# Patient Record
Sex: FEMALE | Race: WHITE | NOT HISPANIC OR LATINO | URBAN - METROPOLITAN AREA
[De-identification: names, ages, dates, MRNs, and addresses within clinical notes are randomized per-mention and may not be internally consistent; named-entity substitution may affect disease eponyms.]

---

## 2017-11-12 ENCOUNTER — EMERGENCY (EMERGENCY)
Facility: HOSPITAL | Age: 47
LOS: 0 days | Discharge: HOME | End: 2017-11-12

## 2017-11-12 DIAGNOSIS — M25.561 PAIN IN RIGHT KNEE: ICD-10-CM
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
## 2017-11-12 DIAGNOSIS — S89.91XA UNSPECIFIED INJURY OF RIGHT LOWER LEG, INITIAL ENCOUNTER: ICD-10-CM

## 2017-11-12 DIAGNOSIS — R07.9 CHEST PAIN, UNSPECIFIED: ICD-10-CM
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
## 2019-11-23 ENCOUNTER — EMERGENCY (EMERGENCY)
Facility: HOSPITAL | Age: 49
LOS: 0 days | Discharge: HOME | End: 2019-11-23
Attending: EMERGENCY MEDICINE | Admitting: EMERGENCY MEDICINE
Payer: COMMERCIAL

## 2019-11-23 VITALS — SYSTOLIC BLOOD PRESSURE: 145 MMHG | HEART RATE: 68 BPM | DIASTOLIC BLOOD PRESSURE: 89 MMHG

## 2019-11-23 VITALS
RESPIRATION RATE: 18 BRPM | OXYGEN SATURATION: 100 % | DIASTOLIC BLOOD PRESSURE: 81 MMHG | TEMPERATURE: 98 F | SYSTOLIC BLOOD PRESSURE: 152 MMHG | HEART RATE: 68 BPM

## 2019-11-23 DIAGNOSIS — M62.830 MUSCLE SPASM OF BACK: ICD-10-CM

## 2019-11-23 DIAGNOSIS — M54.2 CERVICALGIA: ICD-10-CM

## 2019-11-23 DIAGNOSIS — I10 ESSENTIAL (PRIMARY) HYPERTENSION: ICD-10-CM

## 2019-11-23 LAB
ALBUMIN SERPL ELPH-MCNC: 4.4 G/DL — SIGNIFICANT CHANGE UP (ref 3.5–5.2)
ALP SERPL-CCNC: 86 U/L — SIGNIFICANT CHANGE UP (ref 30–115)
ALT FLD-CCNC: 28 U/L — SIGNIFICANT CHANGE UP (ref 0–41)
ANION GAP SERPL CALC-SCNC: 10 MMOL/L — SIGNIFICANT CHANGE UP (ref 7–14)
AST SERPL-CCNC: 21 U/L — SIGNIFICANT CHANGE UP (ref 0–41)
BASOPHILS # BLD AUTO: 0.02 K/UL — SIGNIFICANT CHANGE UP (ref 0–0.2)
BASOPHILS NFR BLD AUTO: 0.4 % — SIGNIFICANT CHANGE UP (ref 0–1)
BILIRUB SERPL-MCNC: 0.9 MG/DL — SIGNIFICANT CHANGE UP (ref 0.2–1.2)
BUN SERPL-MCNC: 13 MG/DL — SIGNIFICANT CHANGE UP (ref 10–20)
CALCIUM SERPL-MCNC: 9.5 MG/DL — SIGNIFICANT CHANGE UP (ref 8.5–10.1)
CHLORIDE SERPL-SCNC: 102 MMOL/L — SIGNIFICANT CHANGE UP (ref 98–110)
CO2 SERPL-SCNC: 28 MMOL/L — SIGNIFICANT CHANGE UP (ref 17–32)
CREAT SERPL-MCNC: 0.8 MG/DL — SIGNIFICANT CHANGE UP (ref 0.7–1.5)
EOSINOPHIL # BLD AUTO: 0.11 K/UL — SIGNIFICANT CHANGE UP (ref 0–0.7)
EOSINOPHIL NFR BLD AUTO: 2 % — SIGNIFICANT CHANGE UP (ref 0–8)
GLUCOSE SERPL-MCNC: 86 MG/DL — SIGNIFICANT CHANGE UP (ref 70–99)
HCG SERPL QL: NEGATIVE — SIGNIFICANT CHANGE UP
HCT VFR BLD CALC: 40.6 % — SIGNIFICANT CHANGE UP (ref 37–47)
HGB BLD-MCNC: 13.2 G/DL — SIGNIFICANT CHANGE UP (ref 12–16)
IMM GRANULOCYTES NFR BLD AUTO: 0.2 % — SIGNIFICANT CHANGE UP (ref 0.1–0.3)
LYMPHOCYTES # BLD AUTO: 1.84 K/UL — SIGNIFICANT CHANGE UP (ref 1.2–3.4)
LYMPHOCYTES # BLD AUTO: 34.3 % — SIGNIFICANT CHANGE UP (ref 20.5–51.1)
MAGNESIUM SERPL-MCNC: 2.4 MG/DL — SIGNIFICANT CHANGE UP (ref 1.8–2.4)
MCHC RBC-ENTMCNC: 28.6 PG — SIGNIFICANT CHANGE UP (ref 27–31)
MCHC RBC-ENTMCNC: 32.5 G/DL — SIGNIFICANT CHANGE UP (ref 32–37)
MCV RBC AUTO: 87.9 FL — SIGNIFICANT CHANGE UP (ref 81–99)
MONOCYTES # BLD AUTO: 0.61 K/UL — HIGH (ref 0.1–0.6)
MONOCYTES NFR BLD AUTO: 11.4 % — HIGH (ref 1.7–9.3)
NEUTROPHILS # BLD AUTO: 2.78 K/UL — SIGNIFICANT CHANGE UP (ref 1.4–6.5)
NEUTROPHILS NFR BLD AUTO: 51.7 % — SIGNIFICANT CHANGE UP (ref 42.2–75.2)
NRBC # BLD: 0 /100 WBCS — SIGNIFICANT CHANGE UP (ref 0–0)
PLATELET # BLD AUTO: 225 K/UL — SIGNIFICANT CHANGE UP (ref 130–400)
POTASSIUM SERPL-MCNC: 4.8 MMOL/L — SIGNIFICANT CHANGE UP (ref 3.5–5)
POTASSIUM SERPL-SCNC: 4.8 MMOL/L — SIGNIFICANT CHANGE UP (ref 3.5–5)
PROT SERPL-MCNC: 7.6 G/DL — SIGNIFICANT CHANGE UP (ref 6–8)
RBC # BLD: 4.62 M/UL — SIGNIFICANT CHANGE UP (ref 4.2–5.4)
RBC # FLD: 12.9 % — SIGNIFICANT CHANGE UP (ref 11.5–14.5)
SODIUM SERPL-SCNC: 140 MMOL/L — SIGNIFICANT CHANGE UP (ref 135–146)
TROPONIN T SERPL-MCNC: <0.01 NG/ML — SIGNIFICANT CHANGE UP
WBC # BLD: 5.37 K/UL — SIGNIFICANT CHANGE UP (ref 4.8–10.8)
WBC # FLD AUTO: 5.37 K/UL — SIGNIFICANT CHANGE UP (ref 4.8–10.8)

## 2019-11-23 PROCEDURE — 93010 ELECTROCARDIOGRAM REPORT: CPT

## 2019-11-23 PROCEDURE — 71046 X-RAY EXAM CHEST 2 VIEWS: CPT | Mod: 26

## 2019-11-23 PROCEDURE — 99285 EMERGENCY DEPT VISIT HI MDM: CPT

## 2019-11-23 RX ORDER — METHOCARBAMOL 500 MG/1
1500 TABLET, FILM COATED ORAL ONCE
Refills: 0 | Status: COMPLETED | OUTPATIENT
Start: 2019-11-23 | End: 2019-11-23

## 2019-11-23 RX ORDER — KETOROLAC TROMETHAMINE 30 MG/ML
30 SYRINGE (ML) INJECTION ONCE
Refills: 0 | Status: DISCONTINUED | OUTPATIENT
Start: 2019-11-23 | End: 2019-11-23

## 2019-11-23 RX ORDER — METHOCARBAMOL 500 MG/1
2 TABLET, FILM COATED ORAL
Qty: 24 | Refills: 0
Start: 2019-11-23 | End: 2019-11-26

## 2019-11-23 RX ORDER — SODIUM CHLORIDE 9 MG/ML
1000 INJECTION INTRAMUSCULAR; INTRAVENOUS; SUBCUTANEOUS ONCE
Refills: 0 | Status: COMPLETED | OUTPATIENT
Start: 2019-11-23 | End: 2019-11-23

## 2019-11-23 RX ADMIN — SODIUM CHLORIDE 1000 MILLILITER(S): 9 INJECTION INTRAMUSCULAR; INTRAVENOUS; SUBCUTANEOUS at 17:46

## 2019-11-23 RX ADMIN — METHOCARBAMOL 1500 MILLIGRAM(S): 500 TABLET, FILM COATED ORAL at 17:46

## 2019-11-23 RX ADMIN — Medication 30 MILLIGRAM(S): at 17:46

## 2019-11-23 NOTE — ED PROVIDER NOTE - PATIENT PORTAL LINK FT
You can access the FollowMyHealth Patient Portal offered by [facility] by registering at the following website: [email or web address]. By joining [employer]’s FollowMyHealth portal, you will also be able to view your health information using other applications (apps) compatible with our system.

## 2019-11-23 NOTE — ED ADULT NURSE NOTE - OBJECTIVE STATEMENT
pt 50 y/o female presents to ED c/o [facility] blood pressure pt should be on HTN medication but does not take. pt denies chest pain at this time.

## 2019-11-23 NOTE — ED PROVIDER NOTE - CARE PLAN
Principal Discharge DX:	Back pain Principal Discharge DX:	Back pain  Secondary Diagnosis:	Trapezius muscle spasm

## 2019-11-23 NOTE — ED PROVIDER NOTE - CLINICAL SUMMARY MEDICAL DECISION MAKING FREE TEXT BOX
pt with trapezius muscle spasm, symptomatic treatment, ekg, cxr, and labs reassuring.  feeling improved, rx for muscle relaxant for home.

## 2019-11-23 NOTE — ED PROVIDER NOTE - NSFOLLOWUPCLINICS_GEN_ALL_ED_FT
[facility] ([facility])  Rehabilitation  [facility] [address] [place] [address]  Phone: [phone]  Fax:   Follow Up Time: 1-3 Days

## 2019-11-23 NOTE — ED PROVIDER NOTE - NS ED ROS FT
Constitutional: See HPI.  Eyes: No visual changes, eye pain or discharge  ENMT: No hearing changes, pain, discharge or infections  Cardiac: No SOB or edema. No chest pain with exertion.  Respiratory: No cough or respiratory distress.  GI: No nausea, vomiting, diarrhea or abdominal pain.  : No dysuria, frequency or burning. No Discharge  MS: + back pain.  No myalgia, muscle weakness, joint pain  Neuro: No headache or weakness. No LOC.  Skin: No skin rash.  Except as documented in the HPI, all other systems are negative.

## 2019-11-23 NOTE — ED PROVIDER NOTE - OBJECTIVE STATEMENT
49 y.o female w 49 y.o female w/ hx of HTN presents to the ED for evaluation of L upper back pain.  States she woke up this morning and developed gradual onset L upper back pain, mild severity, worse w/ ROM, alleviated with rest, mild severity, no radiation of pain.  Denies injury/trauma, chest pain, dyspnea, fever, chills, nausea, vomiting, diaphoresis.

## 2019-11-23 NOTE — ED PROVIDER NOTE - PHYSICAL EXAMINATION
CONST: Well appearing in NAD  EYES: Sclera and conjunctiva clear.  NECK: No midline C/T/L tenderness  CARD: Normal S1 S2; Normal rate and rhythm  RESP: Equal BS B/L, No wheezes, rhonchi or rales. No distress  GI: Soft, non-tender, non-distended.  MS: + TTP L trapezius tenderness, + spasms, mild TTP L cervical paravertebral region,  Normal ROM in all extremities. No edema of lower extremities, no calf pain, radial pulses 2+ bilaterally  SKIN: Warm, dry, no acute rashes. Good turgor  NEURO: A&Ox3, No focal deficits. Strength 5/5 with no sensory deficits. Steady gait

## 2019-11-23 NOTE — ED PROVIDER NOTE - CARE PROVIDERS DIRECT ADDRESSES
[email or web address]

## 2019-11-23 NOTE — ED PROVIDER NOTE - CARE PROVIDER_API CALL
Aroldo Mcdonald)  Cardiovascular Disease; Internal Medicine; Interventional Cardiology; Nuclear Cardiology  [address] [place] [address]  Phone: [phone]  Fax: [phone]  Follow Up Time: 1-3 Days

## 2019-11-23 NOTE — ED PROVIDER NOTE - ATTENDING CONTRIBUTION TO CARE
50 yo f with pmh of htn presents with left upper back pain.  pt says mild radiation to shoulder.  pt was worried about her heart.  no cp, no n/v/d, no sob.  pt says woke up wit the pain, and worsened.  had similar pain on the right side before.  admits that she pins her phone between and shoulder and head sometimes while talking on the phone.  pain worse on neck rotation.  exam: nad, ncat, perrl, eomi, mmm, rrr, ctab, abd soft, nt,nd aox3, +left trapezius, pain illicited on neck rotation, no midline cspine ttp.  imp: pt with trapezius muscle spasm, symptomatic treatment, will check ekg and labs for reassurance.

## 2019-11-25 PROBLEM — I10 ESSENTIAL (PRIMARY) HYPERTENSION: Chronic | Status: ACTIVE | Noted: 2019-11-23

## 2019-11-26 ENCOUNTER — OTHER (OUTPATIENT)
Age: 49
End: 2019-11-26

## 2019-11-26 ENCOUNTER — RX CHANGE (OUTPATIENT)
Age: 49
End: 2019-11-26

## 2019-11-26 ENCOUNTER — APPOINTMENT (OUTPATIENT)
Dept: CARDIOLOGY | Facility: CLINIC | Age: 49
End: 2019-11-26
Payer: COMMERCIAL

## 2019-11-26 ENCOUNTER — OUTPATIENT (OUTPATIENT)
Dept: OUTPATIENT SERVICES | Facility: HOSPITAL | Age: 49
LOS: 1 days | Discharge: HOME | End: 2019-11-26

## 2019-11-26 VITALS
SYSTOLIC BLOOD PRESSURE: 130 MMHG | WEIGHT: 208 LBS | DIASTOLIC BLOOD PRESSURE: 86 MMHG | HEART RATE: 71 BPM | HEIGHT: 68 IN | BODY MASS INDEX: 31.52 KG/M2

## 2019-11-26 PROBLEM — Z00.00 ENCOUNTER FOR PREVENTIVE HEALTH EXAMINATION: Status: ACTIVE | Noted: 2019-11-26

## 2019-11-26 PROCEDURE — 99204 OFFICE O/P NEW MOD 45 MIN: CPT

## 2019-11-26 RX ORDER — VALSARTAN AND HYDROCHLOROTHIAZIDE 80; 12.5 MG/1; MG/1
80-12.5 TABLET, FILM COATED ORAL DAILY
Qty: 90 | Refills: 3 | Status: DISCONTINUED | COMMUNITY
Start: 2019-11-26 | End: 2019-11-26

## 2019-11-26 NOTE — PHYSICAL EXAM
[General Appearance - Well Developed] : well developed [Normal Conjunctiva] : the conjunctiva exhibited no abnormalities [General Appearance - In No Acute Distress] : no acute distress [Eyelids - No Xanthelasma] : the eyelids demonstrated no xanthelasmas [Heart Rate And Rhythm] : heart rate and rhythm were normal [Heart Sounds] : normal S1 and S2 [Murmurs] : no murmurs present [Respiration, Rhythm And Depth] : normal respiratory rhythm and effort [Exaggerated Use Of Accessory Muscles For Inspiration] : no accessory muscle use [Auscultation Breath Sounds / Voice Sounds] : lungs were clear to auscultation bilaterally [Abdomen Soft] : soft [Abdomen Tenderness] : non-tender [Abdomen Mass (___ Cm)] : no abdominal mass palpated [Abnormal Walk] : normal gait [Gait - Sufficient For Exercise Testing] : the gait was sufficient for exercise testing [Nail Clubbing] : no clubbing of the fingernails [Cyanosis, Localized] : no localized cyanosis [] : no ischemic changes [Skin Color & Pigmentation] : normal skin color and pigmentation [No Skin Ulcers] : no skin ulcer [Oriented To Time, Place, And Person] : oriented to person, place, and time [FreeTextEntry1] : no JVD

## 2019-11-26 NOTE — HISTORY OF PRESENT ILLNESS
[FreeTextEntry1] : 50 yo F presents for follow up s/p discharge. New labile -170/ and shoulder pain.\par \par

## 2019-12-05 ENCOUNTER — OUTPATIENT (OUTPATIENT)
Dept: OUTPATIENT SERVICES | Facility: HOSPITAL | Age: 49
LOS: 1 days | Discharge: HOME | End: 2019-12-05
Payer: COMMERCIAL

## 2019-12-05 DIAGNOSIS — Z12.31 ENCOUNTER FOR SCREENING MAMMOGRAM FOR MALIGNANT NEOPLASM OF BREAST: ICD-10-CM

## 2019-12-05 PROCEDURE — 77067 SCR MAMMO BI INCL CAD: CPT | Mod: 26

## 2019-12-05 PROCEDURE — 77063 BREAST TOMOSYNTHESIS BI: CPT | Mod: 26

## 2019-12-06 ENCOUNTER — OUTPATIENT (OUTPATIENT)
Dept: OUTPATIENT SERVICES | Facility: HOSPITAL | Age: 49
LOS: 1 days | Discharge: HOME | End: 2019-12-06
Payer: COMMERCIAL

## 2019-12-06 DIAGNOSIS — Z13.820 ENCOUNTER FOR SCREENING FOR OSTEOPOROSIS: ICD-10-CM

## 2019-12-06 DIAGNOSIS — N95.9 UNSPECIFIED MENOPAUSAL AND PERIMENOPAUSAL DISORDER: ICD-10-CM

## 2019-12-06 DIAGNOSIS — R10.2 PELVIC AND PERINEAL PAIN: ICD-10-CM

## 2019-12-06 PROCEDURE — 76856 US EXAM PELVIC COMPLETE: CPT | Mod: 26

## 2019-12-06 PROCEDURE — 76830 TRANSVAGINAL US NON-OB: CPT | Mod: 26

## 2021-04-22 ENCOUNTER — TRANSCRIPTION ENCOUNTER (OUTPATIENT)
Age: 51
End: 2021-04-22

## 2021-04-26 ENCOUNTER — TRANSCRIPTION ENCOUNTER (OUTPATIENT)
Age: 51
End: 2021-04-26

## 2021-12-30 ENCOUNTER — TRANSCRIPTION ENCOUNTER (OUTPATIENT)
Age: 51
End: 2021-12-30

## 2022-01-01 ENCOUNTER — TRANSCRIPTION ENCOUNTER (OUTPATIENT)
Age: 52
End: 2022-01-01

## 2022-01-04 ENCOUNTER — APPOINTMENT (OUTPATIENT)
Dept: CARDIOLOGY | Facility: CLINIC | Age: 52
End: 2022-01-04
Payer: COMMERCIAL

## 2022-01-04 VITALS
WEIGHT: 204 LBS | TEMPERATURE: 98.6 F | OXYGEN SATURATION: 99 % | HEIGHT: 68 IN | BODY MASS INDEX: 30.92 KG/M2 | HEART RATE: 65 BPM | DIASTOLIC BLOOD PRESSURE: 96 MMHG | SYSTOLIC BLOOD PRESSURE: 140 MMHG

## 2022-01-04 VITALS — DIASTOLIC BLOOD PRESSURE: 90 MMHG | SYSTOLIC BLOOD PRESSURE: 136 MMHG

## 2022-01-04 VITALS — SYSTOLIC BLOOD PRESSURE: 136 MMHG | DIASTOLIC BLOOD PRESSURE: 90 MMHG

## 2022-01-04 DIAGNOSIS — Z78.9 OTHER SPECIFIED HEALTH STATUS: ICD-10-CM

## 2022-01-04 DIAGNOSIS — Z82.49 FAMILY HISTORY OF ISCHEMIC HEART DISEASE AND OTHER DISEASES OF THE CIRCULATORY SYSTEM: ICD-10-CM

## 2022-01-04 PROCEDURE — 93000 ELECTROCARDIOGRAM COMPLETE: CPT

## 2022-01-04 PROCEDURE — 99214 OFFICE O/P EST MOD 30 MIN: CPT

## 2022-01-04 RX ORDER — LOSARTAN POTASSIUM AND HYDROCHLOROTHIAZIDE 12.5; 5 MG/1; MG/1
50-12.5 TABLET ORAL DAILY
Refills: 0 | Status: ACTIVE | COMMUNITY

## 2022-01-04 RX ORDER — LISINOPRIL AND HYDROCHLOROTHIAZIDE TABLETS 10; 12.5 MG/1; MG/1
10-12.5 TABLET ORAL DAILY
Qty: 90 | Refills: 3 | Status: DISCONTINUED | COMMUNITY
Start: 2019-11-26 | End: 2022-01-04

## 2022-01-04 NOTE — HISTORY OF PRESENT ILLNESS
[FreeTextEntry1] : 51 year old female came in for evaluation of HTN for 2.5 years.  This began with menopause.  Her BP sumaya to 154/110 mm Hg since the beginning of this year.  The patient also has chest pains from the neck to the left side of the chest and back and down the left arm for about a week.  She went to urgent care and had a negative Ekg.  The patient is not pleuritic.  It is worse with doing physical work especially if she moves her neck.  The patient denies shortness of breath, palpitations, dizziness, loss of consciousness, or swelling of the ankles.\par The patient never smoked.  The patient never vaped.  The patient denies substance abuse.  The patient has HTN and high cholesterol.  The patient denies DM.  The patient does snore.  She has no difficulty sleeping.  She does not doze off during the day.\par \par The patient had strep throat and it affected her kidneys with nephritis  (possibly nephrotic syndrome)  and was treated with cortisone.  This was treated 12 years ago.\par \par \par PMHX:\par \par HTN - not controlled\par High cholesterol - stable\par Chest pain - new\par Tingling in left arm - new\par \par Medications reviewed and reconciled with patient.  Patient is compliant with taking appropriate medications at appropriate doses at appropriate intervals without missing doses.\par \par \par

## 2022-01-04 NOTE — PHYSICAL EXAM
[Well Developed] : well developed [Well Nourished] : well nourished [No Acute Distress] : no acute distress [Normal Conjunctiva] : normal conjunctiva [Normal Venous Pressure] : normal venous pressure [No Carotid Bruit] : no carotid bruit [Normal S1, S2] : normal S1, S2 [No Murmur] : no murmur [No Rub] : no rub [No Gallop] : no gallop [Clear Lung Fields] : clear lung fields [Good Air Entry] : good air entry [No Respiratory Distress] : no respiratory distress  [Soft] : abdomen soft [Non Tender] : non-tender [No Masses/organomegaly] : no masses/organomegaly [Normal Bowel Sounds] : normal bowel sounds [Normal Gait] : normal gait [No Edema] : no edema [No Cyanosis] : no cyanosis [No Clubbing] : no clubbing [No Varicosities] : no varicosities [No Rash] : no rash [No Skin Lesions] : no skin lesions [Moves all extremities] : moves all extremities [No Focal Deficits] : no focal deficits [Normal Speech] : normal speech [Alert and Oriented] : alert and oriented [Normal memory] : normal memory [de-identified] : Fundi - disk margins are sharp, AV nicking and increased light reflex are noted. [de-identified] : Mallampati score is 2 [de-identified] : Tenderness on palpation of cervical neck and rotation of neck to left shoulder reproduces the symptoms

## 2022-01-04 NOTE — DISCUSSION/SUMMARY
[With Me] : with me [FreeTextEntry3] : After testing [FreeTextEntry1] : Ekg results were reviewed.\par Urgent care notes reviewed\par \par Fasting CBC, BMP, LFTs, Lipid Profile, HgbA1c, CRP, UA, Urine Microalbumin, Mg, Ca, TSH, T3, T4 prior to next visit\par 24 hour urine for protein, volume, creatiniine, creatinine clearance, VMA, metanephrines, catecholamines, free cortisol\par 8AM cortisol level, BMP\par Plasma renin activity and direct level\par Renal artery duplex studies\par Echocardiogram\par Low salt diet\par f/u after testing\par \par Number/complexity of problems -  Mod - 1 undiagnosed new problem with uncertain prognosis\par Amount/complexity of data -history, exam, urgent care notes reviewed, ekg reviewed\par Risk of complications - Mod\par \par

## 2022-01-06 LAB
ALBUMIN SERPL ELPH-MCNC: 4.6 G/DL
ALP BLD-CCNC: 92 U/L
ALT SERPL-CCNC: 29 U/L
ANION GAP SERPL CALC-SCNC: 18 MMOL/L
AST SERPL-CCNC: 22 U/L
BASOPHILS # BLD AUTO: 0.02 K/UL
BASOPHILS NFR BLD AUTO: 0.5 %
BILIRUB DIRECT SERPL-MCNC: 0.2 MG/DL
BILIRUB INDIRECT SERPL-MCNC: 0.6 MG/DL
BILIRUB SERPL-MCNC: 0.8 MG/DL
BUN SERPL-MCNC: 21 MG/DL
CALCIUM SERPL-MCNC: 9.6 MG/DL
CHLORIDE SERPL-SCNC: 103 MMOL/L
CHOLEST SERPL-MCNC: 205 MG/DL
CO2 SERPL-SCNC: 22 MMOL/L
CORTIS SERPL-MCNC: 10.9 UG/DL
CREAT SERPL-MCNC: 0.92 MG/DL
CRP SERPL HS-MCNC: 4.04 MG/L
EOSINOPHIL # BLD AUTO: 0.14 K/UL
EOSINOPHIL NFR BLD AUTO: 3.2 %
ESTIMATED AVERAGE GLUCOSE: 97 MG/DL
GLUCOSE SERPL-MCNC: 82 MG/DL
HBA1C MFR BLD HPLC: 5 %
HCT VFR BLD CALC: 42.2 %
HDLC SERPL-MCNC: 42 MG/DL
HGB BLD-MCNC: 13.3 G/DL
IMM GRANULOCYTES NFR BLD AUTO: 0 %
LDLC SERPL CALC-MCNC: 138 MG/DL
LYMPHOCYTES # BLD AUTO: 1.46 K/UL
LYMPHOCYTES NFR BLD AUTO: 33.2 %
MAGNESIUM SERPL-MCNC: 2.4 MG/DL
MAN DIFF?: NORMAL
MCHC RBC-ENTMCNC: 28.9 PG
MCHC RBC-ENTMCNC: 31.5 GM/DL
MCV RBC AUTO: 91.7 FL
MONOCYTES # BLD AUTO: 0.47 K/UL
MONOCYTES NFR BLD AUTO: 10.7 %
NEUTROPHILS # BLD AUTO: 2.31 K/UL
NEUTROPHILS NFR BLD AUTO: 52.4 %
NONHDLC SERPL-MCNC: 163 MG/DL
PLATELET # BLD AUTO: 248 K/UL
POTASSIUM SERPL-SCNC: 4.3 MMOL/L
PROT SERPL-MCNC: 7.5 G/DL
RBC # BLD: 4.6 M/UL
RBC # FLD: 13.2 %
RENIN PLASMA: 10.9 PG/ML
SODIUM SERPL-SCNC: 143 MMOL/L
T3 SERPL-MCNC: 114 NG/DL
T4 SERPL-MCNC: 8.6 UG/DL
TRIGL SERPL-MCNC: 128 MG/DL
TSH SERPL-ACNC: 1.34 UIU/ML
WBC # FLD AUTO: 4.4 K/UL

## 2022-01-11 LAB — RENIN ACTIVITY, PLASMA: 1.09 NG/ML/HR

## 2022-01-13 ENCOUNTER — LABORATORY RESULT (OUTPATIENT)
Age: 52
End: 2022-01-13

## 2022-01-13 ENCOUNTER — APPOINTMENT (OUTPATIENT)
Dept: CARDIOLOGY | Facility: CLINIC | Age: 52
End: 2022-01-13
Payer: COMMERCIAL

## 2022-01-13 PROCEDURE — 93306 TTE W/DOPPLER COMPLETE: CPT

## 2022-01-13 PROCEDURE — 93975 VASCULAR STUDY: CPT

## 2022-01-14 LAB
BSA DERIVED: 1.73 M2
CREAT 24H UR-MCNC: 0.8 G/24 H
CREAT ?TM UR-MCNC: 69 MG/DL
CREAT CL 24H UR+SERPL-VRATE: 64 ML/MIN
CREAT SPEC-SCNC: 127 MG/DL
MICROALBUMIN 24H UR DL<=1MG/L-MCNC: <1.2 MG/DL
MICROALBUMIN/CREAT 24H UR-RTO: NORMAL MG/G
PROT 24H UR-MRATE: 15 MG/DL
PROT ?TM UR-MCNC: 24 HR
PROT UR-MCNC: 184 MG/24 H
SPECIMEN VOL 24H UR: 1225 ML

## 2022-01-19 LAB
CORTIS 24H UR-MCNC: 24 H
CORTIS 24H UR-MRATE: 8.7 MCG/24 H
SPECIMEN VOL 24H UR: 1225 ML

## 2022-01-20 LAB
DOPAMINE UR-MCNC: 108 UG/L
DOPAMINE, UR, 24HR: 132 UG/24 HR
EPINEPH UR-MCNC: 2 UG/L
EPINEPHRINE, U, 24HR: 2 UG/24 HR
NOREPINEPH UR-MCNC: 20 UG/L
NOREPINEPHRINE,U,24H: 25 UG/24 HR

## 2022-01-21 LAB
CREAT ?TM UR-MCNC: 118 MG/DL
METANEPH 24H UR-MRATE: 61 UG/24 HR
METANEPHS 24H UR-MCNC: 50 UG/L
NORMETANEPHRINE 24 HR URINE: 208 UG/24 HR
NORMETANEPHRINE 24H UR-MCNC: 170 UG/L
VMA/G CREATININE: 2.5 MG/G CREAT

## 2022-01-31 ENCOUNTER — APPOINTMENT (OUTPATIENT)
Dept: CARDIOLOGY | Facility: CLINIC | Age: 52
End: 2022-01-31
Payer: COMMERCIAL

## 2022-01-31 VITALS
HEART RATE: 85 BPM | SYSTOLIC BLOOD PRESSURE: 116 MMHG | BODY MASS INDEX: 31.22 KG/M2 | DIASTOLIC BLOOD PRESSURE: 80 MMHG | OXYGEN SATURATION: 98 % | HEIGHT: 68 IN | WEIGHT: 206 LBS

## 2022-01-31 DIAGNOSIS — R20.2 ANESTHESIA OF SKIN: ICD-10-CM

## 2022-01-31 DIAGNOSIS — I10 ESSENTIAL (PRIMARY) HYPERTENSION: ICD-10-CM

## 2022-01-31 DIAGNOSIS — R07.9 CHEST PAIN, UNSPECIFIED: ICD-10-CM

## 2022-01-31 DIAGNOSIS — E78.5 HYPERLIPIDEMIA, UNSPECIFIED: ICD-10-CM

## 2022-01-31 DIAGNOSIS — R20.0 ANESTHESIA OF SKIN: ICD-10-CM

## 2022-01-31 PROCEDURE — 99214 OFFICE O/P EST MOD 30 MIN: CPT

## 2022-01-31 PROCEDURE — 93000 ELECTROCARDIOGRAM COMPLETE: CPT

## 2022-01-31 RX ORDER — AZITHROMYCIN 250 MG/1
250 TABLET, FILM COATED ORAL
Qty: 6 | Refills: 0 | Status: DISCONTINUED | COMMUNITY
Start: 2021-12-08 | End: 2022-01-31

## 2022-01-31 RX ORDER — MELOXICAM 15 MG/1
15 TABLET ORAL
Qty: 30 | Refills: 0 | Status: DISCONTINUED | COMMUNITY
Start: 2021-09-13 | End: 2022-01-31

## 2022-01-31 NOTE — DISCUSSION/SUMMARY
[With Me] : with me [___ Month(s)] : in [unfilled] month(s) [FreeTextEntry1] : Last visit note reviewed.\par Recent lab results were reviewed.\par Ekg results were reviewed.\par Echocardiogram results were reviewed.\par Renal artery duplex results were reviewed.\par \par Fasting CBC, BMP, LFTs, Lipid Profile, HgbA1c, CRP, UA, Urine Microalbumin, Mg, Ca, TSH, T3, T4 prior to next visit\par low salt low chooltesterol diet\par f/u 6 months\par \par \par Number/complexity of problems - Mod - 2 or more chronic stable illnesses\par Amount/complexity of data -history, exam, reviewed old note, labs reviewed, ekg reviewed, echo reviewed, renal artery duplex reivewed\par Risk of complications - Low\par \par \par

## 2022-01-31 NOTE — HISTORY OF PRESENT ILLNESS
[FreeTextEntry1] : 51 year old female came in for evaluation of HTN for 2.5 years.  This began with menopause.  Her BP sumaya to 154/110 mm Hg since the beginning of this year.  The patient also has chest pains from the neck to the left side of the chest and back and down the left arm for about a week.  She went to urgent care and had a negative Ekg.  The patient is not pleuritic.  It is worse with doing physical work especially if she moves her neck.  The patient denies shortness of breath, palpitations, dizziness, loss of consciousness, or swelling of the ankles.\par The patient never smoked.  The patient never vaped.  The patient denies substance abuse.  The patient has HTN and high cholesterol.  The patient denies DM.  The patient does snore.  She has no difficulty sleeping.  She does not doze off during the day.\par \par The patient had strep throat and it affected her kidneys with nephritis  (possibly nephrotic syndrome)  and was treated with cortisone.  This was treated 12 years ago.\par \par The patient states that her left arm pain has improved.\par \par \par PMHX:\par \par HTN - controlled\par High cholesterol - stable\par Chest pain - improved\par Tingling in left arm - improved\par \par Medications reviewed and reconciled with patient.  Patient is compliant with taking appropriate medications at appropriate doses at appropriate intervals without missing doses.\par \par \par

## 2022-01-31 NOTE — PHYSICAL EXAM
[Well Developed] : well developed [Well Nourished] : well nourished [No Acute Distress] : no acute distress [Normal Conjunctiva] : normal conjunctiva [Normal Venous Pressure] : normal venous pressure [No Carotid Bruit] : no carotid bruit [Normal S1, S2] : normal S1, S2 [No Murmur] : no murmur [No Rub] : no rub [No Gallop] : no gallop [Clear Lung Fields] : clear lung fields [Good Air Entry] : good air entry [No Respiratory Distress] : no respiratory distress  [Soft] : abdomen soft [Non Tender] : non-tender [No Masses/organomegaly] : no masses/organomegaly [Normal Bowel Sounds] : normal bowel sounds [Normal Gait] : normal gait [No Edema] : no edema [No Cyanosis] : no cyanosis [No Clubbing] : no clubbing [No Varicosities] : no varicosities [No Rash] : no rash [No Skin Lesions] : no skin lesions [Moves all extremities] : moves all extremities [No Focal Deficits] : no focal deficits [Normal Speech] : normal speech [Alert and Oriented] : alert and oriented [Normal memory] : normal memory [de-identified] : Tenderness on palpation of cervical neck and rotation of neck to left shoulder reproduces the symptoms

## 2022-01-31 NOTE — ASSESSMENT
[FreeTextEntry1] : HTN - controlled\par High cholesterol - stable\par Chest pain - improved\par Tingling in left arm - improved

## 2022-07-18 ENCOUNTER — APPOINTMENT (OUTPATIENT)
Dept: CARDIOLOGY | Facility: CLINIC | Age: 52
End: 2022-07-18

## 2023-01-09 ENCOUNTER — APPOINTMENT (OUTPATIENT)
Dept: ORTHOPEDIC SURGERY | Facility: CLINIC | Age: 53
End: 2023-01-09
Payer: OTHER MISCELLANEOUS

## 2023-01-09 VITALS — BODY MASS INDEX: 31.83 KG/M2 | HEIGHT: 68 IN | WEIGHT: 210 LBS

## 2023-01-09 DIAGNOSIS — I10 ESSENTIAL (PRIMARY) HYPERTENSION: ICD-10-CM

## 2023-01-09 PROCEDURE — 20611 DRAIN/INJ JOINT/BURSA W/US: CPT | Mod: 50

## 2023-01-09 PROCEDURE — J3490M: CUSTOM

## 2023-01-09 PROCEDURE — 99214 OFFICE O/P EST MOD 30 MIN: CPT | Mod: 25

## 2023-01-09 PROCEDURE — 73562 X-RAY EXAM OF KNEE 3: CPT | Mod: 50

## 2023-01-09 NOTE — DISCUSSION/SUMMARY
[de-identified] : The patient was advised of the diagnosis.  The natural history of the pathology was explained in full to the patient in layman's terms. All questions were answered.  The risks and benefits of surgical and non-surgical treatment alternatives were explained in full to the patient.\par

## 2023-01-09 NOTE — ASSESSMENT
[FreeTextEntry1] : Mild OA right knee, old MRI showing mod OA without tear.  Left knee similar appearance on XR.  B/L cortisone inj today and PT, reeval in 1 month.  Declined HA injections.

## 2023-01-09 NOTE — IMAGING
[de-identified] : RIght knee: Mild effusion.  ROM 0-120.  Medial tenderness.  NVI.\par \par Left knee: Mild effusion.  ROM 0-120.  Medial tenderness.  NVI.

## 2023-01-09 NOTE — HISTORY OF PRESENT ILLNESS
[Work related] : work related [9] : 9 [Not working due to injury] : Work status: not working due to injury [de-identified] : 1/9/23: Right > left knee pain x 1+ year.  Seen in Nov 2021 - had right knee inj and PT which helped.  Lately has pain with stairs and walking.  No groin pain.  Cannot take NSAIDs due to kidney disease. [FreeTextEntry3] : 7/13/2021

## 2023-01-09 NOTE — REASON FOR VISIT
[FreeTextEntry2] : 01/09/2023 This is a  52 year female present for Rt Knee , injury from last year , swelling and stiffness  \par

## 2023-03-13 ENCOUNTER — APPOINTMENT (OUTPATIENT)
Dept: ORTHOPEDIC SURGERY | Facility: CLINIC | Age: 53
End: 2023-03-13
Payer: OTHER MISCELLANEOUS

## 2023-03-13 PROCEDURE — 99072 ADDL SUPL MATRL&STAF TM PHE: CPT

## 2023-03-13 PROCEDURE — 99214 OFFICE O/P EST MOD 30 MIN: CPT

## 2023-03-14 NOTE — ASSESSMENT
[FreeTextEntry1] : Prev Doc:\par Mild OA right knee, old MRI showing mod OA without tear.  Left knee similar appearance on XR.  B/L cortisone inj today and PT, reeval in 1 month.  Declined HA injections.\par \par 3/13/23 cont sig pain lorenza knee rt >Left - does not have other joint pain -- some swlling -  inj with min help - we will get MRI - I am also getting blood work as her swelling and pain does not match her xray finds [place]

## 2023-03-14 NOTE — HISTORY OF PRESENT ILLNESS
[Work related] : work related [9] : 9 [Not working due to injury] : Work status: not working due to injury [de-identified] : 3/13 local nsaid cream helps - PT not helpful but on ly 3 sessions - inj lorenza no sig change or help -  \par \par Prev Doc:\par 1/9/23: Right > left knee pain x 1+ year.  Seen in Nov 2021 - had right knee inj and PT which helped.  Lately has pain with stairs and walking.  No groin pain.  Cannot take NSAIDs due to kidney disease. [] : Post Surgical Visit: no [FreeTextEntry3] : 7/13/2021 [de-identified] : PT, CSI

## 2023-03-14 NOTE — DISCUSSION/SUMMARY
[de-identified] : The patient was advised of the diagnosis.  The natural history of the pathology was explained in full to the patient in layman's terms. All questions were answered.  The risks and benefits of surgical and non-surgical treatment alternatives were explained in full to the patient.\par

## 2023-03-14 NOTE — PROCEDURE
[FreeTextEntry3] : Large joint injection was performed on both knees. The indication for this procedure was pain, inflammation, and x-ray evidence of Osteoarthritis on this or prior visit. The site was prepped with betadine, ethyl chloride sprayed topically, and sterile technique used. An injection of Lidocaine 3cc of 1% , Bupivacaine (Marcaine) 5cc of 0.25% , Methylprednisolone (Depomedrol) cc of 80 mg was used. Patient was advised to call if redness, pain, or fever occur, apply ice for 15 minutes out of every hour for the next 12-24 hours as tolerated and patient was advised to rest the joint(s) for days.\par Patient has tried OTC's including aspirin, Ibuprofen, Aleve, etc or prescription NSAIDS, and/or exercises at home and/or physical therapy without satisfactory response and patient had decreased mobility in the joint. Ultrasound guidance was indicated for this patient due to better visualize joint space. All ultrasound images have been permanently captured and stored accordingly in our picture.

## 2023-03-14 NOTE — IMAGING
[de-identified] : RIght knee: Mild effusion.  ROM 0-120.  Medial tenderness.  NVI. swellingofsoft tissues \par \par Left knee: Mild effusion.  ROM 0-120.  Medial tenderness.  NVI.

## 2023-03-22 ENCOUNTER — FORM ENCOUNTER (OUTPATIENT)
Age: 53
End: 2023-03-22

## 2023-03-23 ENCOUNTER — APPOINTMENT (OUTPATIENT)
Dept: MRI IMAGING | Facility: CLINIC | Age: 53
End: 2023-03-23
Payer: OTHER MISCELLANEOUS

## 2023-03-23 PROCEDURE — 73721 MRI JNT OF LWR EXTRE W/O DYE: CPT | Mod: RT

## 2023-04-18 ENCOUNTER — APPOINTMENT (OUTPATIENT)
Dept: ORTHOPEDIC SURGERY | Facility: CLINIC | Age: 53
End: 2023-04-18
Payer: OTHER MISCELLANEOUS

## 2023-04-18 VITALS — HEIGHT: 69 IN | WEIGHT: 210 LBS | BODY MASS INDEX: 31.1 KG/M2

## 2023-04-18 PROCEDURE — 99214 OFFICE O/P EST MOD 30 MIN: CPT

## 2023-04-18 NOTE — IMAGING
[de-identified] : RIght knee: Mild effusion.  ROM 0-120.  Medial tenderness.  NVI. swellingofsoft tissues \par \par Left knee: Mild effusion.  ROM 0-120.  Medial tenderness.  NVI.

## 2023-04-18 NOTE — DATA REVIEWED
[MRI] : MRI [Right] : of the right [Knee] : knee [Report was reviewed and noted in the chart] : The report was reviewed and noted in the chart [I independently reviewed and interpreted images and report] : I independently reviewed and interpreted images and report [I reviewed the films/CD and additionally noted] : I reviewed the films/CD and additionally noted [FreeTextEntry1] : moderate tricompartmental OA and MMT

## 2023-04-18 NOTE — HISTORY OF PRESENT ILLNESS
[9] : 9 [Dull/Aching] : dull/aching [de-identified] : 4/18/23: Continued pain in the knee. She presents for MRI review. She got little relief from injection. \par \par Prev Doc:\par 1/9/23: Right > left knee pain x 1+ year.  Seen in Nov 2021 - had right knee inj and PT which helped.  Lately has pain with stairs and walking.  No groin pain.  Cannot take NSAIDs due to kidney disease.\par 3/13 local nsaid cream helps - PT not helpful but on ly 3 sessions - inj lorenza no sig change or help -

## 2023-04-18 NOTE — DISCUSSION/SUMMARY
[de-identified] : The patient was advised of the diagnosis.  The natural history of the pathology was explained in full to the patient in layman's terms. All questions were answered.  The risks and benefits of surgical and non-surgical treatment alternatives were explained in full to the patient.\par

## 2023-04-18 NOTE — ASSESSMENT
[FreeTextEntry1] : Prev Doc:\par Mild OA right knee, old MRI showing mod OA without tear.  Left knee similar appearance on XR.  B/L cortisone inj today and PT, reeval in 1 month.  Declined HA injections.\par 3/13/23 cont sig pain lorenza knee rt >Left - does not have other joint pain -- some swlling -  inj with min help - we will get MRI - I am also getting blood work as her swelling and pain does not match her xray finds - [place]  \par \par 4/18/23: MRI right knee revealing tricompartmental OA and a small MMT. At this point, I recommend continued conservative treatment including proceeding with Euflexxa injections. Will obtain auth for this. Briefly discussed arthroscope vs TKA in the future.

## 2023-05-16 ENCOUNTER — APPOINTMENT (OUTPATIENT)
Dept: ORTHOPEDIC SURGERY | Facility: CLINIC | Age: 53
End: 2023-05-16

## 2023-05-26 ENCOUNTER — FORM ENCOUNTER (OUTPATIENT)
Age: 53
End: 2023-05-26

## 2023-05-30 ENCOUNTER — APPOINTMENT (OUTPATIENT)
Dept: ORTHOPEDIC SURGERY | Facility: CLINIC | Age: 53
End: 2023-05-30
Payer: OTHER MISCELLANEOUS

## 2023-05-30 VITALS — BODY MASS INDEX: 30.36 KG/M2 | WEIGHT: 205 LBS | HEIGHT: 69 IN

## 2023-05-30 PROCEDURE — 20611 DRAIN/INJ JOINT/BURSA W/US: CPT | Mod: RT

## 2023-05-30 PROCEDURE — 99213 OFFICE O/P EST LOW 20 MIN: CPT | Mod: 25

## 2023-05-30 NOTE — ASSESSMENT
[FreeTextEntry1] : Prev Doc:\par Mild OA right knee, old MRI showing mod OA without tear.  Left knee similar appearance on XR.  B/L cortisone inj today and PT, reeval in 1 month.  Declined HA injections.\par 3/13/23 cont sig pain lorenza knee rt >Left - does not have other joint pain -- some swlling -  inj with min help - we will get MRI - I am also getting blood work as her swelling and pain does not match her xray finds - [place]  \par 4/18/23: MRI right knee revealing tricompartmental OA and a small MMT. At this point, I recommend continued conservative treatment including proceeding with Euflexxa injections. Will obtain auth for this. Briefly discussed arthroscope vs TKA in the future.\par \par 5/30/23: Euflexxa #1 R knee today, tolerated well

## 2023-05-30 NOTE — DISCUSSION/SUMMARY
[de-identified] : The patient was advised of the diagnosis.  The natural history of the pathology was explained in full to the patient in layman's terms. All questions were answered.  The risks and benefits of surgical and non-surgical treatment alternatives were explained in full to the patient.\par \par The natural progression of Osteoarthritis was explained to the patient.  We discussed the possible treatment options from conservative to operative.  These included NSAIDS, Glucosamine and Chondrotin sulfate, and Physical Therapy as well different types of injections.  We also discussed that at some point they may progress to needed a TKA.  Information and pamphlets were given.\par \par The risks, benefits, contents and alternatives to injection were explained in full to the patient.  Risks outlined include but are not limited to infection, sepsis, bleeding, scarring, skin discoloration, temporary increase in pain, syncopal episode, failure to resolve symptoms, allergic reaction, flare reaction, permanent white skin discoloration, symptom recurrence, and elevation of blood sugar in diabetics.  Patient understood the risks.  All questions were answered.  After discussion of options, patient requested an injection.  Oral informed consent was obtained and sterile prep was done of the injection site.  Sterile technique was used to introduce the mixture.  Patient tolerated the procedure well.  Patient advised to ice the injection site this evening.  Signs and symptoms of infection reviewed and patient advised to call immediately for redness, fevers, and/or chills.\par \par Progress note completed by Janell Ramsey PA-C\par The [place] assigned on this date saw this patient independently.  I have reviewed the note and agree with the treatment provided. - Dr. Dewey

## 2023-05-30 NOTE — IMAGING
[de-identified] : RIght knee: Mild effusion.  ROM 0-120.  Medial tenderness.  NVI. swellingofsoft tissues \par \par Left knee: Mild effusion.  ROM 0-120.  Medial tenderness.  NVI.

## 2023-05-30 NOTE — HISTORY OF PRESENT ILLNESS
[8] : 8 [0] : 0 [Sharp] : sharp [Tightness] : tightness [Intermittent] : intermittent [Standing] : standing [Walking] : walking [de-identified] : 5/30/23: Here for Euflexxa #1 R knee \par \par Prev Doc:\par 1/9/23: Right > left knee pain x 1+ year.  Seen in Nov 2021 - had right knee inj and PT which helped.  Lately has pain with stairs and walking.  No groin pain.  Cannot take NSAIDs due to kidney disease.\par 3/13 local nsaid cream helps - PT not helpful but on ly 3 sessions - inj lorenza no sig change or help -  \par 4/18/23: Continued pain in the knee. She presents for MRI review. She got little relief from injection.  [] : no [FreeTextEntry1] : R knee [FreeTextEntry5] : Pt is here for 1st injection. Pt feels pain with prolonged standing. Also notes cracking when walking. Pain is localized. Notes n/t in R knee.

## 2023-05-30 NOTE — PROCEDURE
[Large Joint Injection] : Large joint injection [Right] : of the right [Pain] : pain [Knee] : knee [Inflammation] : inflammation [X-ray evidence of Osteoarthritis on this or prior visit] : x-ray evidence of Osteoarthritis on this or prior visit [Alcohol] : alcohol [Betadine] : betadine [Ethyl Chloride sprayed topically] : ethyl chloride sprayed topically [Sterile technique used] : sterile technique used [___ cc    1%] : Lidocaine ~Vcc of 1%  [Euflexxa(20mg)] : 20mg of Euflexxa [#1] : series #1 [Call if redness, pain or fever occur] : call if redness, pain or fever occur [] : Patient tolerated procedure well [Apply ice for 15min out of every hour for the next 12-24 hours as tolerated] : apply ice for 15 minutes out of every hour for the next 12-24 hours as tolerated [Patient was advised to rest the joint(s) for ____ days] : patient was advised to rest the joint(s) for [unfilled] days [Previous OTC use and PT nontherapeutic] : patient has tried OTC's including aspirin, Ibuprofen, Aleve, etc or prescription NSAIDS, and/or exercises at home and/or physical therapy without satisfactory response [Risks, benefits, alternatives discussed / Verbal consent obtained] : the risks benefits, and alternatives have been discussed, and verbal consent was obtained [Prior failure or difficult injection] : prior failure or difficult injection [All ultrasound images have been permanently captured and stored accordingly in our picture archiving and communication system] : All ultrasound images have been permanently captured and stored accordingly in our picture archiving and communication system [Visualization of the needle and placement of injection was performed without complication] : visualization of the needle and placement of injection was performed without complication

## 2023-06-06 ENCOUNTER — APPOINTMENT (OUTPATIENT)
Dept: ORTHOPEDIC SURGERY | Facility: CLINIC | Age: 53
End: 2023-06-06
Payer: OTHER MISCELLANEOUS

## 2023-06-06 VITALS — HEIGHT: 69 IN | BODY MASS INDEX: 30.21 KG/M2 | WEIGHT: 204 LBS

## 2023-06-06 PROCEDURE — 20611 DRAIN/INJ JOINT/BURSA W/US: CPT | Mod: RT

## 2023-06-06 PROCEDURE — 99212 OFFICE O/P EST SF 10 MIN: CPT | Mod: 25

## 2023-06-06 NOTE — IMAGING
[de-identified] : RIght knee: Mild effusion.  ROM 0-120.  Medial tenderness.  NVI. swellingofsoft tissues \par \par Left knee: Mild effusion.  ROM 0-120.  Medial tenderness.  NVI.

## 2023-06-06 NOTE — ASSESSMENT
[FreeTextEntry1] : Prev Doc:\par Mild OA right knee, old MRI showing mod OA without tear.  Left knee similar appearance on XR.  B/L cortisone inj today and PT, reeval in 1 month.  Declined HA injections.\par 3/13/23 cont sig pain lorenza knee rt >Left - does not have other joint pain -- some swlling -  inj with min help - we will get MRI - I am also getting blood work as her swelling and pain does not match her xray finds - [place]  \par 4/18/23: MRI right knee revealing tricompartmental OA and a small MMT. At this point, I recommend continued conservative treatment including proceeding with Euflexxa injections. Will obtain auth for this. Briefly discussed arthroscope vs TKA in the future.\par 5/30/23: Euflexxa #1 R knee today, tolerated well \par \par 6/6/23: Inj tolerated well.

## 2023-06-06 NOTE — PROCEDURE
[Large Joint Injection] : Large joint injection [Right] : of the right [Knee] : knee [Pain] : pain [Inflammation] : inflammation [X-ray evidence of Osteoarthritis on this or prior visit] : x-ray evidence of Osteoarthritis on this or prior visit [Alcohol] : alcohol [Betadine] : betadine [Ethyl Chloride sprayed topically] : ethyl chloride sprayed topically [Sterile technique used] : sterile technique used [___ cc    1%] : Lidocaine ~Vcc of 1%  [Euflexxa(20mg)] : 20mg of Euflexxa [] : Patient tolerated procedure well [Call if redness, pain or fever occur] : call if redness, pain or fever occur [Apply ice for 15min out of every hour for the next 12-24 hours as tolerated] : apply ice for 15 minutes out of every hour for the next 12-24 hours as tolerated [Patient was advised to rest the joint(s) for ____ days] : patient was advised to rest the joint(s) for [unfilled] days [Previous OTC use and PT nontherapeutic] : patient has tried OTC's including aspirin, Ibuprofen, Aleve, etc or prescription NSAIDS, and/or exercises at home and/or physical therapy without satisfactory response [Risks, benefits, alternatives discussed / Verbal consent obtained] : the risks benefits, and alternatives have been discussed, and verbal consent was obtained [Prior failure or difficult injection] : prior failure or difficult injection [All ultrasound images have been permanently captured and stored accordingly in our picture archiving and communication system] : All ultrasound images have been permanently captured and stored accordingly in our picture archiving and communication system [Visualization of the needle and placement of injection was performed without complication] : visualization of the needle and placement of injection was performed without complication [#2] : series #2

## 2023-06-06 NOTE — HISTORY OF PRESENT ILLNESS
[2] : 2 [Euflexxa] : Euflexxa [de-identified] : 6/6/23: Euflexxa #2 right knee.\par \par Prev Doc:\par 1/9/23: Right > left knee pain x 1+ year.  Seen in Nov 2021 - had right knee inj and PT which helped.  Lately has pain with stairs and walking.  No groin pain.  Cannot take NSAIDs due to kidney disease.\par 3/13 local nsaid cream helps - PT not helpful but on ly 3 sessions - inj lorenza no sig change or help -  \par 4/18/23: Continued pain in the knee. She presents for MRI review. She got little relief from injection. \par 5/30/23: Here for Euflexxa #1 R knee  [de-identified] : 5/30/23 [de-identified] : right knee  [de-identified] : Euflexxa

## 2023-06-13 ENCOUNTER — APPOINTMENT (OUTPATIENT)
Dept: ORTHOPEDIC SURGERY | Facility: CLINIC | Age: 53
End: 2023-06-13
Payer: OTHER MISCELLANEOUS

## 2023-06-13 PROCEDURE — 99213 OFFICE O/P EST LOW 20 MIN: CPT | Mod: 25

## 2023-06-13 PROCEDURE — 20611 DRAIN/INJ JOINT/BURSA W/US: CPT

## 2023-06-13 NOTE — HISTORY OF PRESENT ILLNESS
[3] : 3 [Euflexxa] : Euflexxa [de-identified] : 6/13/23: Here for Euflexxa #3 R knee \par \par Prev Doc:\par 1/9/23: Right > left knee pain x 1+ year.  Seen in Nov 2021 - had right knee inj and PT which helped.  Lately has pain with stairs and walking.  No groin pain.  Cannot take NSAIDs due to kidney disease.\par 3/13 local nsaid cream helps - PT not helpful but on ly 3 sessions - inj lorenza no sig change or help -  \par 4/18/23: Continued pain in the knee. She presents for MRI review. She got little relief from injection. \par 5/30/23: Here for Euflexxa #1 R knee \par 6/6/23: Euflexxa #2 right knee. [de-identified] : 06/06/23 [de-identified] : right knee  [de-identified] : Euflexxa

## 2023-06-13 NOTE — ASSESSMENT
[FreeTextEntry1] : Prev Doc:\par Mild OA right knee, old MRI showing mod OA without tear.  Left knee similar appearance on XR.  B/L cortisone inj today and PT, reeval in 1 month.  Declined HA injections.\par 3/13/23 cont sig pain lorenza knee rt >Left - does not have other joint pain -- some swlling -  inj with min help - we will get MRI - I am also getting blood work as her swelling and pain does not match her xray finds - [place]  \par 4/18/23: MRI right knee revealing tricompartmental OA and a small MMT. At this point, I recommend continued conservative treatment including proceeding with Euflexxa injections. Will obtain auth for this. Briefly discussed arthroscope vs TKA in the future.\par 5/30/23: Euflexxa #1 R knee today, tolerated well \par 6/6/23: Inj tolerated well.\par \par 6/13/23: Euflexxa #3 R knee today, tolerated well

## 2023-06-13 NOTE — PROCEDURE
[Large Joint Injection] : Large joint injection [Knee] : knee [Right] : of the right [Pain] : pain [Inflammation] : inflammation [X-ray evidence of Osteoarthritis on this or prior visit] : x-ray evidence of Osteoarthritis on this or prior visit [Alcohol] : alcohol [Betadine] : betadine [Ethyl Chloride sprayed topically] : ethyl chloride sprayed topically [Sterile technique used] : sterile technique used [___ cc    1%] : Lidocaine ~Vcc of 1%  [Euflexxa(20mg)] : 20mg of Euflexxa [] : Patient tolerated procedure well [Call if redness, pain or fever occur] : call if redness, pain or fever occur [Apply ice for 15min out of every hour for the next 12-24 hours as tolerated] : apply ice for 15 minutes out of every hour for the next 12-24 hours as tolerated [Patient was advised to rest the joint(s) for ____ days] : patient was advised to rest the joint(s) for [unfilled] days [Previous OTC use and PT nontherapeutic] : patient has tried OTC's including aspirin, Ibuprofen, Aleve, etc or prescription NSAIDS, and/or exercises at home and/or physical therapy without satisfactory response [Risks, benefits, alternatives discussed / Verbal consent obtained] : the risks benefits, and alternatives have been discussed, and verbal consent was obtained [Prior failure or difficult injection] : prior failure or difficult injection [All ultrasound images have been permanently captured and stored accordingly in our picture archiving and communication system] : All ultrasound images have been permanently captured and stored accordingly in our picture archiving and communication system [Visualization of the needle and placement of injection was performed without complication] : visualization of the needle and placement of injection was performed without complication [#3] : series #3

## 2023-06-13 NOTE — IMAGING
[de-identified] : RIght knee: Mild effusion.  ROM 0-120.  Medial tenderness.  NVI. swellingofsoft tissues \par \par Left knee: Mild effusion.  ROM 0-120.  Medial tenderness.  NVI.

## 2023-12-05 ENCOUNTER — APPOINTMENT (OUTPATIENT)
Dept: ORTHOPEDIC SURGERY | Facility: CLINIC | Age: 53
End: 2023-12-05
Payer: OTHER MISCELLANEOUS

## 2023-12-05 VITALS — HEIGHT: 69 IN | BODY MASS INDEX: 30.21 KG/M2 | WEIGHT: 204 LBS

## 2023-12-05 DIAGNOSIS — M17.0 BILATERAL PRIMARY OSTEOARTHRITIS OF KNEE: ICD-10-CM

## 2023-12-05 PROCEDURE — 99214 OFFICE O/P EST MOD 30 MIN: CPT

## 2023-12-21 NOTE — HISTORY OF PRESENT ILLNESS
[Euflexxa] : Euflexxa [4] : 4 [3] : 3 [Dull/Aching] : dull/aching [Throbbing] : throbbing [de-identified] : 12/5/23: F/U: R knee- She finished the Euflexxa series at last visit. States it had been beneficial. She has also lost weight since last visit. Most bothersome with startup.   Prev Doc: 1/9/23: Right > left knee pain x 1+ year.  Seen in Nov 2021 - had right knee inj and PT which helped.  Lately has pain with stairs and walking.  No groin pain.  Cannot take NSAIDs due to kidney disease. 3/13 local nsaid cream helps - PT not helpful but on ly 3 sessions - inj lorenza no sig change or help -   4/18/23: Continued pain in the knee. She presents for MRI review. She got little relief from injection.  5/30/23: Here for Euflexxa #1 R knee  6/6/23: Euflexxa #2 right knee. 6/13/23: Here for Euflexxa #3 R knee  [] : no [FreeTextEntry5] : Patient is here for FU on the RIGHT knee. Pain is improving. [place] relief from Euflexxa. [de-identified] : 06/06/23 [de-identified] : right knee  [de-identified] : Euflexxa

## 2023-12-21 NOTE — IMAGING
[de-identified] : RIght knee: Mild effusion.  ROM 0-120.  Medial tenderness.  NVI. swellingofsoft tissues \par  \par  Left knee: Mild effusion.  ROM 0-120.  Medial tenderness.  NVI.

## 2023-12-21 NOTE — ASSESSMENT
[FreeTextEntry1] : Prev Doc: Mild OA right knee, old MRI showing mod OA without tear. Left knee similar appearance on XR. B/L cortisone inj today and PT, reeval in 1 month. Declined HA injections. 3/13/23 cont sig pain lorenza knee rt >Left - does not have other joint pain -- some swlling - inj with min help - we will get MRI - I am also getting blood work as her swelling and pain does not match her xray finds - [place] 4/18/23: MRI right knee revealing tricompartmental OA and a small MMT. At this point, I recommend continued conservative treatment including proceeding with Euflexxa injections. Will obtain auth for this. Briefly discussed arthroscope vs TKA in the future. 5/30/23: Euflexxa #1 R knee today, tolerated well 6/6/23: Inj tolerated well. 6/13/23: Euflexxa #3 R knee today, tolerated well.  12/5/23: She is having some returning pain over the past couple of weeks. Will request auth for repeat Euflexxa series. f/up after 12/13/23 to begin the series

## 2023-12-21 NOTE — IMAGING
[de-identified] : RIght knee: Mild effusion.  ROM 0-120.  Medial tenderness.  NVI. swellingofsoft tissues \par  \par  Left knee: Mild effusion.  ROM 0-120.  Medial tenderness.  NVI.

## 2023-12-21 NOTE — HISTORY OF PRESENT ILLNESS
[Euflexxa] : Euflexxa [4] : 4 [3] : 3 [Dull/Aching] : dull/aching [Throbbing] : throbbing [de-identified] : 12/5/23: F/U: R knee- She finished the Euflexxa series at last visit. States it had been beneficial. She has also lost weight since last visit. Most bothersome with startup.   Prev Doc: 1/9/23: Right > left knee pain x 1+ year.  Seen in Nov 2021 - had right knee inj and PT which helped.  Lately has pain with stairs and walking.  No groin pain.  Cannot take NSAIDs due to kidney disease. 3/13 local nsaid cream helps - PT not helpful but on ly 3 sessions - inj lorenza no sig change or help -   4/18/23: Continued pain in the knee. She presents for MRI review. She got little relief from injection.  5/30/23: Here for Euflexxa #1 R knee  6/6/23: Euflexxa #2 right knee. 6/13/23: Here for Euflexxa #3 R knee  [] : no [FreeTextEntry5] : Patient is here for FU on the RIGHT knee. Pain is improving. [place] relief from Euflexxa. [de-identified] : 06/06/23 [de-identified] : right knee  [de-identified] : Euflexxa

## 2024-01-02 ENCOUNTER — APPOINTMENT (OUTPATIENT)
Dept: ORTHOPEDIC SURGERY | Facility: CLINIC | Age: 54
End: 2024-01-02
Payer: OTHER MISCELLANEOUS

## 2024-01-02 VITALS — BODY MASS INDEX: 28.29 KG/M2 | HEIGHT: 69 IN | WEIGHT: 191 LBS

## 2024-01-02 PROCEDURE — 99214 OFFICE O/P EST MOD 30 MIN: CPT | Mod: 25

## 2024-01-02 PROCEDURE — 20611 DRAIN/INJ JOINT/BURSA W/US: CPT | Mod: RT

## 2024-01-02 NOTE — IMAGING
[de-identified] : RIght knee: Mild effusion.  ROM 0-120.  Medial tenderness.  NVI. swellingofsoft tissues \par  \par  Left knee: Mild effusion.  ROM 0-120.  Medial tenderness.  NVI.

## 2024-01-02 NOTE — DISCUSSION/SUMMARY
[de-identified] : The patient was advised of the diagnosis.  The natural history of the pathology was explained in full to the patient in layman's terms. All questions were answered.  The risks and benefits of surgical and non-surgical treatment alternatives were explained in full to the patient.  The natural progression of Osteoarthritis was explained to the patient.  We discussed the possible treatment options from conservative to operative.  These included NSAIDS, Glucosamine and Chondrotin sulfate, and Physical Therapy as well different types of injections.  We also discussed that at some point they may progress to needed a TKA.  Information and pamphlets were given.  The risks, benefits, contents and alternatives to injection were explained in full to the patient.  Risks outlined include but are not limited to infection, sepsis, bleeding, scarring, skin discoloration, temporary increase in pain, syncopal episode, failure to resolve symptoms, allergic reaction, flare reaction, symptom recurrence. Patient understood the risks.  All questions were answered. Oral informed consent was obtained and sterile prep was done of the injection site.  Sterile technique was used to introduce the mixture.  Patient tolerated the procedure well.  Patient advised to ice the injection site this evening.  Signs and symptoms of infection reviewed and patient advised to call immediately for redness, fevers, and/or chills.  Progress note completed by Janell Ramsey PA-C, acting as scribe.

## 2024-01-02 NOTE — PROCEDURE
[Large Joint Injection] : Large joint injection [Right] : of the right [Knee] : knee [Pain] : pain [Inflammation] : inflammation [X-ray evidence of Osteoarthritis on this or prior visit] : x-ray evidence of Osteoarthritis on this or prior visit [Alcohol] : alcohol [Betadine] : betadine [Ethyl Chloride sprayed topically] : ethyl chloride sprayed topically [Sterile technique used] : sterile technique used [___ cc    1%] : Lidocaine ~Vcc of 1%  [Euflexxa(20mg)] : 20mg of Euflexxa [#1] : series #1 [] : Patient tolerated procedure well [Call if redness, pain or fever occur] : call if redness, pain or fever occur [Apply ice for 15min out of every hour for the next 12-24 hours as tolerated] : apply ice for 15 minutes out of every hour for the next 12-24 hours as tolerated [Patient was advised to rest the joint(s) for ____ days] : patient was advised to rest the joint(s) for [unfilled] days [Previous OTC use and PT nontherapeutic] : patient has tried OTC's including aspirin, Ibuprofen, Aleve, etc or prescription NSAIDS, and/or exercises at home and/or physical therapy without satisfactory response [Risks, benefits, alternatives discussed / Verbal consent obtained] : the risks benefits, and alternatives have been discussed, and verbal consent was obtained [Prior failure or difficult injection] : prior failure or difficult injection [All ultrasound images have been permanently captured and stored accordingly in our picture archiving and communication system] : All ultrasound images have been permanently captured and stored accordingly in our picture archiving and communication system [Visualization of the needle and placement of injection was performed without complication] : visualization of the needle and placement of injection was performed without complication

## 2024-01-02 NOTE — HISTORY OF PRESENT ILLNESS
[4] : 4 [Dull/Aching] : dull/aching [Throbbing] : throbbing [3] : 3 [Euflexxa] : Euflexxa [FreeTextEntry5] : Patient is here for FU on the RIGHT knee. Pain is improving. [place] relief from Euflexxa. [7] : 7 [6] : 6 [de-identified] : 1/2/24: FU R knee- Difficulty with stairs and general stiffness overall.   Prev Doc: 1/9/23: Right > left knee pain x 1+ year.  Seen in Nov 2021 - had right knee inj and PT which helped.  Lately has pain with stairs and walking.  No groin pain.  Cannot take NSAIDs due to kidney disease. 3/13 local nsaid cream helps - PT not helpful but on ly 3 sessions - inj lorenza no sig change or help -   4/18/23: Continued pain in the knee. She presents for MRI review. She got little relief from injection.  5/30/23: Here for Euflexxa #1 R knee  6/6/23: Euflexxa #2 right knee. 6/13/23: Here for Euflexxa #3 R knee  12/5/23: F/U: R knee- She finished the Euflexxa series at last visit. States it had been beneficial. She has also lost weight since last visit. Most bothersome with startup.  [] : Post Surgical Visit: no [de-identified] : 06/06/23 [de-identified] : right knee  [de-identified] : Euflexxa

## 2024-01-02 NOTE — ASSESSMENT
[FreeTextEntry1] : Prev Doc: Mild OA right knee, old MRI showing mod OA without tear. Left knee similar appearance on XR. B/L cortisone inj today and PT, reeval in 1 month. Declined HA injections. 3/13/23 cont sig pain lorenza knee rt >Left - does not have other joint pain -- some swlling - inj with min help - we will get MRI - I am also getting blood work as her swelling and pain does not match her xray finds - [place] 4/18/23: MRI right knee revealing tricompartmental OA and a small MMT. At this point, I recommend continued conservative treatment including proceeding with Euflexxa injections. Will obtain auth for this. Briefly discussed arthroscope vs TKA in the future. 5/30/23: Euflexxa #1 R knee today, tolerated well 6/6/23: Inj tolerated well. 6/13/23: Euflexxa #3 R knee today, tolerated well. 12/5/23: She is having some returning pain over the past couple of weeks. Will request auth for repeat Euflexxa series. f/up after 12/13/23 to begin the series   1/2/24: Euflexxa #1 R knee today, tolerated well.

## 2024-01-08 ENCOUNTER — APPOINTMENT (OUTPATIENT)
Dept: ORTHOPEDIC SURGERY | Facility: CLINIC | Age: 54
End: 2024-01-08
Payer: OTHER MISCELLANEOUS

## 2024-01-08 VITALS — BODY MASS INDEX: 28.29 KG/M2 | WEIGHT: 191 LBS | HEIGHT: 69 IN

## 2024-01-08 PROCEDURE — 20611 DRAIN/INJ JOINT/BURSA W/US: CPT | Mod: RT

## 2024-01-08 PROCEDURE — 99212 OFFICE O/P EST SF 10 MIN: CPT | Mod: 25

## 2024-01-08 NOTE — ASSESSMENT
[FreeTextEntry1] : Prev Doc: Mild OA right knee, old MRI showing mod OA without tear. Left knee similar appearance on XR. B/L cortisone inj today and PT, reeval in 1 month. Declined HA injections. 3/13/23 cont sig pain lorenza knee rt >Left - does not have other joint pain -- some swlling - inj with min help - we will get MRI - I am also getting blood work as her swelling and pain does not match her xray finds - [place] 4/18/23: MRI right knee revealing tricompartmental OA and a small MMT. At this point, I recommend continued conservative treatment including proceeding with Euflexxa injections. Will obtain auth for this. Briefly discussed arthroscope vs TKA in the future. 5/30/23: Euflexxa #1 R knee today, tolerated well 6/6/23: Inj tolerated well. 6/13/23: Euflexxa #3 R knee today, tolerated well. 12/5/23: She is having some returning pain over the past couple of weeks. Will request auth for repeat Euflexxa series. f/up after 12/13/23 to begin the series  1/2/24: Euflexxa #1 R knee today, tolerated well.   1/8/24: Inj tolerated well.

## 2024-01-08 NOTE — IMAGING
[de-identified] : RIght knee: Mild effusion.  ROM 0-120.  Medial tenderness.  NVI. swellingofsoft tissues \par  \par  Left knee: Mild effusion.  ROM 0-120.  Medial tenderness.  NVI.

## 2024-01-08 NOTE — HISTORY OF PRESENT ILLNESS
[7] : 7 [6] : 6 [Dull/Aching] : dull/aching [Throbbing] : throbbing [3] : 3 [Euflexxa] : Euflexxa [de-identified] : 1/8/24: Euflexxa #2 right knee.  Prev Doc: 1/9/23: Right > left knee pain x 1+ year.  Seen in Nov 2021 - had right knee inj and PT which helped.  Lately has pain with stairs and walking.  No groin pain.  Cannot take NSAIDs due to kidney disease. 3/13 local nsaid cream helps - PT not helpful but on ly 3 sessions - inj lorenza no sig change or help -   4/18/23: Continued pain in the knee. She presents for MRI review. She got little relief from injection.  5/30/23: Here for Euflexxa #1 R knee  6/6/23: Euflexxa #2 right knee. 6/13/23: Here for Euflexxa #3 R knee  12/5/23: F/U: R knee- She finished the Euflexxa series at last visit. States it had been beneficial. She has also lost weight since last visit. Most bothersome with startup.  1/2/24: FU R knee- Difficulty with stairs and general stiffness overall.  [] : Post Surgical Visit: no [de-identified] : 06/06/23 [de-identified] : right knee  [de-identified] : Euflexxa

## 2024-01-16 ENCOUNTER — APPOINTMENT (OUTPATIENT)
Dept: ORTHOPEDIC SURGERY | Facility: CLINIC | Age: 54
End: 2024-01-16
Payer: OTHER MISCELLANEOUS

## 2024-01-16 PROCEDURE — 20611 DRAIN/INJ JOINT/BURSA W/US: CPT | Mod: RT

## 2024-01-16 PROCEDURE — 99212 OFFICE O/P EST SF 10 MIN: CPT | Mod: 25

## 2024-01-16 NOTE — ASSESSMENT
[FreeTextEntry1] : Prev Doc: Mild OA right knee, old MRI showing mod OA without tear. Left knee similar appearance on XR. B/L cortisone inj today and PT, reeval in 1 month. Declined HA injections. 3/13/23 cont sig pain lorenza knee rt >Left - does not have other joint pain -- some swlling - inj with min help - we will get MRI - I am also getting blood work as her swelling and pain does not match her xray finds - [place] 4/18/23: MRI right knee revealing tricompartmental OA and a small MMT. At this point, I recommend continued conservative treatment including proceeding with Euflexxa injections. Will obtain auth for this. Briefly discussed arthroscope vs TKA in the future. 5/30/23: Euflexxa #1 R knee today, tolerated well 6/6/23: Inj tolerated well. 6/13/23: Euflexxa #3 R knee today, tolerated well. 12/5/23: She is having some returning pain over the past couple of weeks. Will request auth for repeat Euflexxa series. f/up after 12/13/23 to begin the series  1/2/24: Euflexxa #1 R knee today, tolerated well.  1/8/24: Inj tolerated well.  1/16/24: Inj tolerated well.

## 2024-01-16 NOTE — IMAGING
[de-identified] : RIght knee: Mild effusion.  ROM 0-120.  Medial tenderness.  NVI. swellingofsoft tissues \par  \par  Left knee: Mild effusion.  ROM 0-120.  Medial tenderness.  NVI.

## 2024-01-16 NOTE — HISTORY OF PRESENT ILLNESS
[7] : 7 [6] : 6 [Dull/Aching] : dull/aching [Throbbing] : throbbing [3] : 3 [Euflexxa] : Euflexxa [de-identified] : 1/16/24: Euflexxa #3 right knee.  Prev Doc: 1/9/23: Right > left knee pain x 1+ year.  Seen in Nov 2021 - had right knee inj and PT which helped.  Lately has pain with stairs and walking.  No groin pain.  Cannot take NSAIDs due to kidney disease. 3/13 local nsaid cream helps - PT not helpful but on ly 3 sessions - inj lorenza no sig change or help -   4/18/23: Continued pain in the knee. She presents for MRI review. She got little relief from injection.  5/30/23: Here for Euflexxa #1 R knee  6/6/23: Euflexxa #2 right knee. 6/13/23: Here for Euflexxa #3 R knee  12/5/23: F/U: R knee- She finished the Euflexxa series at last visit. States it had been beneficial. She has also lost weight since last visit. Most bothersome with startup.  1/2/24: FU R knee- Difficulty with stairs and general stiffness overall.  1/8/24: Euflexxa #2 right knee. [] : Post Surgical Visit: no [de-identified] : 06/06/23 [de-identified] : right knee  [de-identified] : Euflexxa

## 2024-01-16 NOTE — PROCEDURE
[Large Joint Injection] : Large joint injection [Right] : of the right [Knee] : knee [Pain] : pain [Inflammation] : inflammation [X-ray evidence of Osteoarthritis on this or prior visit] : x-ray evidence of Osteoarthritis on this or prior visit [Alcohol] : alcohol [Betadine] : betadine [Ethyl Chloride sprayed topically] : ethyl chloride sprayed topically [Sterile technique used] : sterile technique used [___ cc    1%] : Lidocaine ~Vcc of 1%  [Euflexxa(20mg)] : 20mg of Euflexxa [] : Patient tolerated procedure well [Call if redness, pain or fever occur] : call if redness, pain or fever occur [Apply ice for 15min out of every hour for the next 12-24 hours as tolerated] : apply ice for 15 minutes out of every hour for the next 12-24 hours as tolerated [Patient was advised to rest the joint(s) for ____ days] : patient was advised to rest the joint(s) for [unfilled] days [Previous OTC use and PT nontherapeutic] : patient has tried OTC's including aspirin, Ibuprofen, Aleve, etc or prescription NSAIDS, and/or exercises at home and/or physical therapy without satisfactory response [Risks, benefits, alternatives discussed / Verbal consent obtained] : the risks benefits, and alternatives have been discussed, and verbal consent was obtained [Prior failure or difficult injection] : prior failure or difficult injection [All ultrasound images have been permanently captured and stored accordingly in our picture archiving and communication system] : All ultrasound images have been permanently captured and stored accordingly in our picture archiving and communication system [Visualization of the needle and placement of injection was performed without complication] : visualization of the needle and placement of injection was performed without complication [#3] : series #3

## 2024-03-05 ENCOUNTER — APPOINTMENT (OUTPATIENT)
Dept: ORTHOPEDIC SURGERY | Facility: CLINIC | Age: 54
End: 2024-03-05
Payer: OTHER MISCELLANEOUS

## 2024-03-05 PROCEDURE — 20611 DRAIN/INJ JOINT/BURSA W/US: CPT | Mod: RT

## 2024-03-05 PROCEDURE — J3490M: CUSTOM

## 2024-03-05 PROCEDURE — 99214 OFFICE O/P EST MOD 30 MIN: CPT | Mod: 25

## 2024-03-05 PROCEDURE — 73562 X-RAY EXAM OF KNEE 3: CPT | Mod: RT

## 2024-03-05 NOTE — WORK
[Total (100%)] : total (100%) [Does not reveal pre-existing condition(s) that may affect treatment/prognosis] : does not reveal pre-existing condition(s) that may affect treatment/prognosis [Patient] : patient [I provided the services listed above] :  I provided the services listed above.

## 2024-04-22 ENCOUNTER — APPOINTMENT (OUTPATIENT)
Dept: ORTHOPEDIC SURGERY | Facility: CLINIC | Age: 54
End: 2024-04-22
Payer: OTHER MISCELLANEOUS

## 2024-04-22 VITALS — BODY MASS INDEX: 28.29 KG/M2 | HEIGHT: 69 IN | WEIGHT: 191 LBS

## 2024-04-22 DIAGNOSIS — S83.241A OTHER TEAR OF MEDIAL MENISCUS, CURRENT INJURY, RIGHT KNEE, INITIAL ENCOUNTER: ICD-10-CM

## 2024-04-22 DIAGNOSIS — M17.11 UNILATERAL PRIMARY OSTEOARTHRITIS, RIGHT KNEE: ICD-10-CM

## 2024-04-22 PROCEDURE — 99214 OFFICE O/P EST MOD 30 MIN: CPT

## 2024-04-22 NOTE — HISTORY OF PRESENT ILLNESS
[Dull/Aching] : dull/aching [Tingling] : tingling [de-identified] : 4/22/24: Cortisone helped at first but pain got worse when she started to go to PT.  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  Seen in Nov 2021 - had right knee inj and PT which helped.  Lately has pain with stairs and walking.  No groin pain.  Cannot take NSAIDs due to kidney disease. 3/13 local nsaid cream helps - PT not helpful but on ly 3 sessions - inj lorenza no sig change or help -   4/18/23: Continued pain in the knee. She presents for MRI review. She got little relief from injection.  5/30/23: Here for Euflexxa #1 R knee  6/6/23: Euflexxa #2 right knee. 6/13/23: Here for Euflexxa #3 R knee  12/5/23: F/U: R knee- She finished the Euflexxa series at last visit. States it had been beneficial. She has also lost weight since last visit. Most bothersome with startup.  1/2/24: FU R knee- Difficulty with stairs and general stiffness overall.  1/8/24: Euflexxa #2 right knee. 1/16/24: Euflexxa #3 right knee. 3/5/24: Here to follow up- RT knee OA with MMT. Finished Euflexxa series in Jan- feels some sig improvement but still pain with prolonged standing and knee chanell. Taking tylenol and icing as needed with good relief. Ambulates without assistance. Currently OOW

## 2024-04-22 NOTE — IMAGING
[de-identified] : Right knee:  No effusion.   ROM 0-120.   Medial tenderness.  Crepitus.  Medial and lateral joint line tenderness.  NVI.  swelling of soft tissues   Left knee:  No effusion.   ROM 0-120.   Medial tenderness.   NVI.  XR RT KNEE showing moderate OA

## 2024-04-22 NOTE — DISCUSSION/SUMMARY
[de-identified] : The patient demonstrated a full understanding of the surgical and non-surgical options.  The risks of surgery were outlined in full to the patient including but not limited to bleeding, scarring, infection, sepsis, neurologic injury, vascular injury, failure to resolve symptoms, symptom recurrence, the need for further surgery, non-healing, wound breakdown, deep vein thrombosis, pulmonary embolism, spontaneous osteonecrosis, anesthesia complications and even death.  The patient understood all the risks and accepted them and understood that other complications could occur that are not mentioned above.  The intraoperative plan, post-operative plan, post-operative expectations and limitations were explained in full.  Expectations from non-surgical treatment were explained in full as well.  The patient demonstrated a complete understanding of the treatment alternatives and requested the above-mentioned procedure.  This will be scheduled accordingly.  The patient has two pathologic conditions at this point.  There is a meniscus tear which is causing symptomology and there is also underlying osteoarthritis.  The patient was counseled that surgical intervention to address the torn meniscus will not change the symptomology attributable to the arthritis.  The patient was advised that the pain attributable to the meniscus tear should be resolved arthroscopically.  However, the patient should expect to have continued aches and pains related to the arthritis, in the form of, but not limited to pain, weather related changes, dull ache, crepitation, limitation of motion and strength and the need for further surgeries. The patient understands that the arthroscopic procedure addresses the meniscus only and that after surgery, the knee will not be 100% but it should be improved with respect to the symptomology attributed to the torn meniscus.  Patient also is aware that further treatment after arthroscopy may be necessary in the form of oral medications, cortisone and/or viscosupplementation injections, and further surgeries including knee replacement.  The patient agrees, understands and wishes to proceed.

## 2024-04-22 NOTE — ASSESSMENT
[FreeTextEntry1] : Prev Doc: Mild OA right knee, old MRI showing mod OA without tear. Left knee similar appearance on XR. B/L cortisone inj today and PT, reeval in 1 month. Declined HA injections. 3/13/23 cont sig pain lorenza knee rt >Left - does not have other joint pain -- some swlling - inj with min help - we will get MRI - I am also getting blood work as her swelling and pain does not match her xray finds - [place] 4/18/23: MRI right knee revealing tricompartmental OA and a small MMT. At this point, I recommend continued conservative treatment including proceeding with Euflexxa injections. Will obtain auth for this. Briefly discussed arthroscope vs TKA in the future. 5/30/23: Euflexxa #1 R knee today, tolerated well 6/6/23: Inj tolerated well. 6/13/23: Euflexxa #3 R knee today, tolerated well. 12/5/23: She is having some returning pain over the past couple of weeks. Will request auth for repeat Euflexxa series. f/up after 12/13/23 to begin the series  1/2/24: Euflexxa #1 R knee today, tolerated well.  1/8/24: Inj tolerated well. 1/16/24: Inj tolerated well. 3/5/24: Pt with moderate RT knee OA, mostly PF OA and MMT. Discussed treatment options- risks and benefits explained. She is well informed and would like to proceed with RT knee csi and a course of physical therapy. Currently looking for a job as she has difficulty standing for long periods of time. Discussed she will likely need TKA in the future. Follow up 1 month  4/22/24: Cont pain, short term relief from last cortisone inj.  DIscussed scope vs TKA - she wishes to proceed with knee arthroscopy given that she had a traumatic injury and no pain prior to injury - she understands that she will likely have cont pain postop due to underlying OA and may still need TKA at some point in the future but I do not see enough OA at this time to go ahead with TKA. <-- Click to add NO significant Past Surgical History

## 2024-04-22 NOTE — WORK
[Total (100%)] : total (100%) [Does not reveal pre-existing condition(s) that may affect treatment/prognosis] : does not reveal pre-existing condition(s) that may affect treatment/prognosis [Patient] : patient [I provided the services listed above] :  I provided the services listed above. [FreeTextEntry1] : guarded

## 2024-07-15 ENCOUNTER — APPOINTMENT (OUTPATIENT)
Dept: ORTHOPEDIC SURGERY | Facility: CLINIC | Age: 54
End: 2024-07-15

## 2024-07-22 ENCOUNTER — APPOINTMENT (OUTPATIENT)
Dept: ORTHOPEDIC SURGERY | Facility: CLINIC | Age: 54
End: 2024-07-22
Payer: OTHER MISCELLANEOUS

## 2024-07-22 VITALS — HEIGHT: 69 IN | WEIGHT: 191 LBS | BODY MASS INDEX: 28.29 KG/M2

## 2024-07-22 DIAGNOSIS — M17.11 UNILATERAL PRIMARY OSTEOARTHRITIS, RIGHT KNEE: ICD-10-CM

## 2024-07-22 DIAGNOSIS — S83.241A OTHER TEAR OF MEDIAL MENISCUS, CURRENT INJURY, RIGHT KNEE, INITIAL ENCOUNTER: ICD-10-CM

## 2024-07-22 PROCEDURE — 99213 OFFICE O/P EST LOW 20 MIN: CPT

## 2024-07-22 NOTE — DISCUSSION/SUMMARY
[de-identified] : The patient was advised of the diagnosis.  The natural history of the pathology was explained in full to the patient in layman's terms. All questions were answered.  The risks and benefits of surgical and non-surgical treatment alternatives were explained in full to the patient.

## 2024-07-22 NOTE — HISTORY OF PRESENT ILLNESS
[3] : 3 [de-identified] : 7/22/24: Cont pain in right knee, hesitant to have surgery at the moment.  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  Seen in Nov 2021 - had right knee inj and PT which helped.  Lately has pain with stairs and walking.  No groin pain.  Cannot take NSAIDs due to kidney disease. 3/13 local nsaid cream helps - PT not helpful but on ly 3 sessions - inj lorenza no sig change or help -   4/18/23: Continued pain in the knee. She presents for MRI review. She got little relief from injection.  5/30/23: Here for Euflexxa #1 R knee  6/6/23: Euflexxa #2 right knee. 6/13/23: Here for Euflexxa #3 R knee  12/5/23: F/U: R knee- She finished the Euflexxa series at last visit. States it had been beneficial. She has also lost weight since last visit. Most bothersome with startup.  1/2/24: FU R knee- Difficulty with stairs and general stiffness overall.  1/8/24: Euflexxa #2 right knee. 1/16/24: Euflexxa #3 right knee. 3/5/24: Here to follow up- RT knee OA with MMT. Finished Euflexxa series in Jan- feels some sig improvement but still pain with prolonged standing and knee chanell. Taking tylenol and icing as needed with good relief. Ambulates without assistance. Currently OOW 4/22/24: Cortisone helped at first but pain got worse when she started to go to PT. [de-identified] : doing therapy

## 2024-07-22 NOTE — ASSESSMENT
[FreeTextEntry1] : Prev Doc: Mild OA right knee, old MRI showing mod OA without tear. Left knee similar appearance on XR. B/L cortisone inj today and PT, reeval in 1 month. Declined HA injections. 3/13/23 cont sig pain lorenza knee rt >Left - does not have other joint pain -- some swlling - inj with min help - we will get MRI - I am also getting blood work as her swelling and pain does not match her xray finds - [place] 4/18/23: MRI right knee revealing tricompartmental OA and a small MMT. At this point, I recommend continued conservative treatment including proceeding with Euflexxa injections. Will obtain auth for this. Briefly discussed arthroscope vs TKA in the future. 5/30/23: Euflexxa #1 R knee today, tolerated well 6/6/23: Inj tolerated well. 6/13/23: Euflexxa #3 R knee today, tolerated well. 12/5/23: She is having some returning pain over the past couple of weeks. Will request auth for repeat Euflexxa series. f/up after 12/13/23 to begin the series  1/2/24: Euflexxa #1 R knee today, tolerated well.  1/8/24: Inj tolerated well. 1/16/24: Inj tolerated well. 3/5/24: Pt with moderate RT knee OA, mostly PF OA and MMT. Discussed treatment options- risks and benefits explained. She is well informed and would like to proceed with RT knee csi and a course of physical therapy. Currently looking for a job as she has difficulty standing for long periods of time. Discussed she will likely need TKA in the future. Follow up 1 month 4/22/24: Cont pain, short term relief from last cortisone inj.  DIscussed scope vs TKA - she wishes to proceed with knee arthroscopy given that she had a traumatic injury and no pain prior to injury - she understands that she will likely have cont pain postop due to underlying OA and may still need TKA at some point in the future but I do not see enough OA at this time to go ahead with TKA.  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  Declined injections for now, will cont PT.

## 2024-07-22 NOTE — IMAGING
[de-identified] : Right knee:  No effusion.   ROM 0-120.   Medial tenderness.  Crepitus.  Medial and lateral joint line tenderness.  NVI.  swelling of soft tissues   Left knee:  No effusion.   ROM 0-120.   Medial tenderness.   NVI.  XR RT KNEE showing moderate OA

## 2024-08-26 ENCOUNTER — APPOINTMENT (OUTPATIENT)
Dept: ORTHOPEDIC SURGERY | Facility: CLINIC | Age: 54
End: 2024-08-26
Payer: OTHER MISCELLANEOUS

## 2024-08-26 DIAGNOSIS — M17.11 UNILATERAL PRIMARY OSTEOARTHRITIS, RIGHT KNEE: ICD-10-CM

## 2024-08-26 DIAGNOSIS — S83.241A OTHER TEAR OF MEDIAL MENISCUS, CURRENT INJURY, RIGHT KNEE, INITIAL ENCOUNTER: ICD-10-CM

## 2024-08-26 PROCEDURE — 99213 OFFICE O/P EST LOW 20 MIN: CPT

## 2024-08-26 NOTE — HISTORY OF PRESENT ILLNESS
[Dull/Aching] : dull/aching [Localized] : localized [de-identified] : 8/26/24: No change, still feels stiff.  No PT auth since last visit.  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  Seen in Nov 2021 - had right knee inj and PT which helped.  Lately has pain with stairs and walking.  No groin pain.  Cannot take NSAIDs due to kidney disease. 3/13 local nsaid cream helps - PT not helpful but on ly 3 sessions - inj lorenza no sig change or help -   4/18/23: Continued pain in the knee. She presents for MRI review. She got little relief from injection.  5/30/23: Here for Euflexxa #1 R knee  6/6/23: Euflexxa #2 right knee. 6/13/23: Here for Euflexxa #3 R knee  12/5/23: F/U: R knee- She finished the Euflexxa series at last visit. States it had been beneficial. She has also lost weight since last visit. Most bothersome with startup.  1/2/24: FU R knee- Difficulty with stairs and general stiffness overall.  1/8/24: Euflexxa #2 right knee. 1/16/24: Euflexxa #3 right knee. 3/5/24: Here to follow up- RT knee OA with MMT. Finished Euflexxa series in Jan- feels some sig improvement but still pain with prolonged standing and knee chanell. Taking tylenol and icing as needed with good relief. Ambulates without assistance. Currently OOW 4/22/24: Cortisone helped at first but pain got worse when she started to go to PT. 7/22/24: Cont pain in right knee, hesitant to have surgery at the moment.

## 2024-08-26 NOTE — IMAGING
[de-identified] : Right knee:  No effusion.   ROM 0-120.   Medial tenderness.  Crepitus.  Medial and lateral joint line tenderness.  NVI.  swelling of soft tissues   Left knee:  No effusion.   ROM 0-120.   Medial tenderness.   NVI.  XR RT KNEE showing moderate OA

## 2024-08-26 NOTE — WORK
[Total (100%)] : total (100%) [Does not reveal pre-existing condition(s) that may affect treatment/prognosis] : does not reveal pre-existing condition(s) that may affect treatment/prognosis [Cannot return to work because ________] : cannot return to work because [unfilled] [N/A] : : Not Applicable [Patient] : patient [No Rx restrictions] : No Rx restrictions. [I provided the services listed above] :  I provided the services listed above. [FreeTextEntry1] : fair

## 2024-08-26 NOTE — ASSESSMENT
[FreeTextEntry1] : Prev Doc: Mild OA right knee, old MRI showing mod OA without tear. Left knee similar appearance on XR. B/L cortisone inj today and PT, reeval in 1 month. Declined HA injections. 3/13/23 cont sig pain lorenza knee rt >Left - does not have other joint pain -- some swlling - inj with min help - we will get MRI - I am also getting blood work as her swelling and pain does not match her xray finds - [place] 4/18/23: MRI right knee revealing tricompartmental OA and a small MMT. At this point, I recommend continued conservative treatment including proceeding with Euflexxa injections. Will obtain auth for this. Briefly discussed arthroscope vs TKA in the future. 5/30/23: Euflexxa #1 R knee today, tolerated well 6/6/23: Inj tolerated well. 6/13/23: Euflexxa #3 R knee today, tolerated well. 12/5/23: She is having some returning pain over the past couple of weeks. Will request auth for repeat Euflexxa series. f/up after 12/13/23 to begin the series  1/2/24: Euflexxa #1 R knee today, tolerated well.  1/8/24: Inj tolerated well. 1/16/24: Inj tolerated well. 3/5/24: Pt with moderate RT knee OA, mostly PF OA and MMT. Discussed treatment options- risks and benefits explained. She is well informed and would like to proceed with RT knee csi and a course of physical therapy. Currently looking for a job as she has difficulty standing for long periods of time. Discussed she will likely need TKA in the future. Follow up 1 month 4/22/24: Cont pain, short term relief from last cortisone inj.  DIscussed scope vs TKA - she wishes to proceed with knee arthroscopy given that she had a traumatic injury and no pain prior to injury - she understands that she will likely have cont pain postop due to underlying OA and may still need TKA at some point in the future but I do not see enough OA at this time to go ahead with TKA. 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  Declined injections for now, will cont PT.  8/26/24: Cont pain in right knee, again request auth for PT as she is not able to have surgery at this time.

## 2024-08-26 NOTE — DISCUSSION/SUMMARY
[de-identified] : The patient was advised of the diagnosis.  The natural history of the pathology was explained in full to the patient in layman's terms. All questions were answered.  The risks and benefits of surgical and non-surgical treatment alternatives were explained in full to the patient.

## 2024-09-23 ENCOUNTER — APPOINTMENT (OUTPATIENT)
Dept: ORTHOPEDIC SURGERY | Facility: CLINIC | Age: 54
End: 2024-09-23
Payer: OTHER MISCELLANEOUS

## 2024-09-23 DIAGNOSIS — M17.11 UNILATERAL PRIMARY OSTEOARTHRITIS, RIGHT KNEE: ICD-10-CM

## 2024-09-23 PROCEDURE — 99214 OFFICE O/P EST MOD 30 MIN: CPT

## 2024-09-23 NOTE — HISTORY OF PRESENT ILLNESS
Cellulitis surrounding G-tube:  Advised to keep the area clean and dry and watch for further signs of infection.  Await culture results.  Keflex uses directed.  Dressing was applied and referral provided for wound care for further evaluation and treatment.   [5] : 5 [de-identified] : 9/23/24: f/up R knee. Continued sharp pains with walking. Notes catching.    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  Seen in Nov 2021 - had right knee inj and PT which helped.  Lately has pain with stairs and walking.  No groin pain.  Cannot take NSAIDs due to kidney disease. 3/13 local nsaid cream helps - PT not helpful but on ly 3 sessions - inj lorenza no sig change or help -   4/18/23: Continued pain in the knee. She presents for MRI review. She got little relief from injection.  5/30/23: Here for Euflexxa #1 R knee  6/6/23: Euflexxa #2 right knee. 6/13/23: Here for Euflexxa #3 R knee  12/5/23: F/U: R knee- She finished the Euflexxa series at last visit. States it had been beneficial. She has also lost weight since last visit. Most bothersome with startup.  1/2/24: FU R knee- Difficulty with stairs and general stiffness overall.  1/8/24: Euflexxa #2 right knee. 1/16/24: Euflexxa #3 right knee. 3/5/24: Here to follow up- RT knee OA with MMT. Finished Euflexxa series in Jan- feels some sig improvement but still pain with prolonged standing and knee chanell. Taking tylenol and icing as needed with good relief. Ambulates without assistance. Currently OOW 4/22/24: Cortisone helped at first but pain got worse when she started to go to PT. 7/22/24: Cont pain in right knee, hesitant to have surgery at the moment. 8/26/24: No change, still feels stiff.  No PT auth since last visit. [FreeTextEntry5] : has not been approved for pt

## 2024-09-23 NOTE — DISCUSSION/SUMMARY
[de-identified] : The patient was advised of the diagnosis.  The natural history of the pathology was explained in full to the patient in layman's terms. All questions were answered.  The risks and benefits of surgical and non-surgical treatment alternatives were explained in full to the patient.  We had an extensive discussion regarding surgical intervention including risk, benefits and alternatives.  The risks include, but are not limited to infection, bleeding, injury to small nerves and blood vessels, pain, stiffness, phlebitis, DVT malunion, nonunion, and need for secondary procedures.  Preoperative, intraoperative and postoperative care were discussed and outlined to the patient as well.  The patient has had an opportunity to ask any question and all were answered to the best of my ability.  Progress Note completed by VIRGINIA Maciel * Dr. Dewey -- The documentation recorded in this note accurately reflects the decisions made by me during this visit.  I interviewed and examined the patient personally and oversaw all medical decisions.

## 2024-09-23 NOTE — ASSESSMENT
[FreeTextEntry1] : Prev Doc: Mild OA right knee, old MRI showing mod OA without tear. Left knee similar appearance on XR. B/L cortisone inj today and PT, reeval in 1 month. Declined HA injections. 3/13/23 cont sig pain lorenza knee rt >Left - does not have other joint pain -- some swlling - inj with min help - we will get MRI - I am also getting blood work as her swelling and pain does not match her xray finds - [place] 4/18/23: MRI right knee revealing tricompartmental OA and a small MMT. At this point, I recommend continued conservative treatment including proceeding with Euflexxa injections. Will obtain auth for this. Briefly discussed arthroscope vs TKA in the future. 5/30/23: Euflexxa #1 R knee today, tolerated well 6/6/23: Inj tolerated well. 6/13/23: Euflexxa #3 R knee today, tolerated well. 12/5/23: She is having some returning pain over the past couple of weeks. Will request auth for repeat Euflexxa series. f/up after 12/13/23 to begin the series  1/2/24: Euflexxa #1 R knee today, tolerated well.  1/8/24: Inj tolerated well. 1/16/24: Inj tolerated well. 3/5/24: Pt with moderate RT knee OA, mostly PF OA and MMT. Discussed treatment options- risks and benefits explained. She is well informed and would like to proceed with RT knee csi and a course of physical therapy. Currently looking for a job as she has difficulty standing for long periods of time. Discussed she will likely need TKA in the future. Follow up 1 month 4/22/24: Cont pain, short term relief from last cortisone inj.  DIscussed scope vs TKA - she wishes to proceed with knee arthroscopy given that she had a traumatic injury and no pain prior to injury - she understands that she will likely have cont pain postop due to underlying OA and may still need TKA at some point in the future but I do not see enough OA at this time to go ahead with TKA. 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  Declined injections for now, will cont PT. 8/26/24: Cont pain in right knee, again request auth for PT as she is not able to have surgery at this time.  9/23/24: Continued pain in the right knee. Advised the sharp pain is likely due to the MMT. She is having mechanical symptoms of catching, especially with pivoting. Discussed R knee arthroscopy, r/b/a, and preop/postop periods in detail. She is well informed and would like to proceed with surgery.

## 2024-09-23 NOTE — IMAGING
[de-identified] : Right knee:  No effusion.   ROM 0-120.   Medial tenderness.  Crepitus.  Medial and lateral joint line tenderness.  +McMurrays  NVI.  swelling of soft tissues   Left knee:  No effusion.   ROM 0-120.   Medial tenderness.   NVI.

## 2024-11-11 ENCOUNTER — APPOINTMENT (OUTPATIENT)
Dept: ORTHOPEDIC SURGERY | Facility: CLINIC | Age: 54
End: 2024-11-11

## 2024-12-05 ENCOUNTER — NON-APPOINTMENT (OUTPATIENT)
Age: 54
End: 2024-12-05

## 2024-12-05 ENCOUNTER — OUTPATIENT (OUTPATIENT)
Dept: OUTPATIENT SERVICES | Facility: HOSPITAL | Age: 54
LOS: 1 days | Discharge: HOME | End: 2024-12-05

## 2024-12-05 VITALS
TEMPERATURE: 98 F | HEART RATE: 69 BPM | HEIGHT: 69 IN | OXYGEN SATURATION: 97 % | WEIGHT: 204.15 LBS | SYSTOLIC BLOOD PRESSURE: 121 MMHG | RESPIRATION RATE: 17 BRPM | DIASTOLIC BLOOD PRESSURE: 86 MMHG

## 2024-12-05 DIAGNOSIS — I10 ESSENTIAL (PRIMARY) HYPERTENSION: ICD-10-CM

## 2024-12-05 DIAGNOSIS — Z98.890 OTHER SPECIFIED POSTPROCEDURAL STATES: Chronic | ICD-10-CM

## 2024-12-05 DIAGNOSIS — Z01.818 ENCOUNTER FOR OTHER PREPROCEDURAL EXAMINATION: ICD-10-CM

## 2024-12-05 DIAGNOSIS — M17.11 UNILATERAL PRIMARY OSTEOARTHRITIS, RIGHT KNEE: ICD-10-CM

## 2024-12-05 NOTE — H&P PST ADULT - NEGATIVE CARDIOVASCULAR SYMPTOMS
no chest pain/no palpitations/no dyspnea on exertion/no orthopnea denies any c/o/no chest pain/no palpitations/no dyspnea on exertion/no orthopnea/no paroxysmal nocturnal dyspnea/no peripheral edema/no claudication

## 2024-12-05 NOTE — H&P PST ADULT - CARDIOVASCULAR COMMENTS
HTN, was high in Nove seen in ER , no new meds , was referred to cardiologist, has lois for CT angio12/6/24 HTN, was high in November 2024 seen in ER [place] , no new meds , was referred to cardiologist, has lois for CT angio12/6/24

## 2024-12-05 NOTE — H&P PST ADULT - NSICDXFAMILYHX_GEN_ALL_CORE_FT
FAMILY HISTORY:  Father  Still living? No  FH: heart disease, Age at diagnosis: Age Unknown    Mother  Still living? No  Family history of cervical cancer, Age at diagnosis: Age Unknown

## 2024-12-05 NOTE — H&P PST ADULT - HISTORY OF PRESENT ILLNESS
55 y/o female, PMH of HTN, injury to right  knee about 2 yrs ago, MRI showed torn meniscus, scheduled for rigth knee arthroscopy on 12/19/24.

## 2024-12-05 NOTE — H&P PST ADULT - PROBLEM SELECTOR PLAN 2
Pt had high BP in Nov , seen in ER and was recommended to see cardiology, pt seeing him on 12/6/24 for CT angio. Advised cardiac eval prior to surgery.   see ABOVE

## 2024-12-05 NOTE — H&P PST ADULT - MUSCULOSKELETAL
details… no joint swelling/no joint erythema/no joint warmth/no calf tenderness/decreased ROM due to pain/strength 5/5 bilateral upper extremities/no chest wall tenderness

## 2024-12-05 NOTE — H&P PST ADULT - PROBLEM SELECTOR PLAN 1
right knee arthroscopy on 12/19/24  Pre op instructions:   Hold OTC supplements.    Medical eval needed  May take Tylenol for pain or headache if needed.    NPO after 11pm to the morning of surgery.   Antibacterial wash instructions given for the morning of surgery  Patient verbalized understanding.

## 2024-12-19 ENCOUNTER — APPOINTMENT (OUTPATIENT)
Dept: ORTHOPEDIC SURGERY | Facility: HOSPITAL | Age: 54
End: 2024-12-19

## 2024-12-19 ENCOUNTER — TRANSCRIPTION ENCOUNTER (OUTPATIENT)
Age: 54
End: 2024-12-19

## 2024-12-19 ENCOUNTER — OUTPATIENT (OUTPATIENT)
Dept: OUTPATIENT SERVICES | Facility: HOSPITAL | Age: 54
LOS: 1 days | Discharge: HOME | End: 2024-12-19

## 2024-12-19 VITALS
HEART RATE: 58 BPM | OXYGEN SATURATION: 100 % | RESPIRATION RATE: 17 BRPM | DIASTOLIC BLOOD PRESSURE: 77 MMHG | SYSTOLIC BLOOD PRESSURE: 114 MMHG | TEMPERATURE: 97 F

## 2024-12-19 VITALS
HEART RATE: 63 BPM | HEIGHT: 69 IN | OXYGEN SATURATION: 98 % | WEIGHT: 201.94 LBS | SYSTOLIC BLOOD PRESSURE: 112 MMHG | TEMPERATURE: 98 F | RESPIRATION RATE: 16 BRPM | DIASTOLIC BLOOD PRESSURE: 74 MMHG

## 2024-12-19 DIAGNOSIS — Z98.890 OTHER SPECIFIED POSTPROCEDURAL STATES: Chronic | ICD-10-CM

## 2024-12-19 PROCEDURE — 29881 ARTHRS KNE SRG MNISECTMY M/L: CPT | Mod: RT

## 2024-12-19 RX ORDER — ACETAMINOPHEN 500MG 500 MG/1
1000 TABLET, COATED ORAL ONCE
Refills: 0 | Status: COMPLETED | OUTPATIENT
Start: 2024-12-19 | End: 2024-12-19

## 2024-12-19 RX ORDER — LOSARTAN POTASSIUM 100 MG/1
1 TABLET, FILM COATED ORAL
Refills: 0 | DISCHARGE

## 2024-12-19 RX ORDER — 0.9 % SODIUM CHLORIDE 0.9 %
1000 INTRAVENOUS SOLUTION INTRAVENOUS
Refills: 0 | Status: DISCONTINUED | OUTPATIENT
Start: 2024-12-19 | End: 2024-12-21

## 2024-12-19 RX ORDER — OXYCODONE AND ACETAMINOPHEN 5; 325 MG/1; MG/1
1 TABLET ORAL
Qty: 18 | Refills: 0
Start: 2024-12-19 | End: 2024-12-21

## 2024-12-19 RX ORDER — FENTANYL 12 UG/H
50 PATCH, EXTENDED RELEASE TRANSDERMAL
Refills: 0 | Status: DISCONTINUED | OUTPATIENT
Start: 2024-12-19 | End: 2024-12-21

## 2024-12-19 RX ORDER — SODIUM CHLORIDE 9 MG/ML
3 INJECTION, SOLUTION INTRAMUSCULAR; INTRAVENOUS; SUBCUTANEOUS EVERY 8 HOURS
Refills: 0 | Status: DISCONTINUED | OUTPATIENT
Start: 2024-12-19 | End: 2024-12-19

## 2024-12-19 RX ORDER — ONDANSETRON HYDROCHLORIDE 4 MG/1
4 TABLET, FILM COATED ORAL ONCE
Refills: 0 | Status: DISCONTINUED | OUTPATIENT
Start: 2024-12-19 | End: 2024-12-21

## 2024-12-19 RX ADMIN — ACETAMINOPHEN 500MG 1000 MILLIGRAM(S): 500 TABLET, COATED ORAL at 08:15

## 2024-12-19 RX ADMIN — Medication 125 MILLILITER(S): at 08:27

## 2024-12-19 RX ADMIN — ACETAMINOPHEN 500MG 400 MILLIGRAM(S): 500 TABLET, COATED ORAL at 08:27

## 2024-12-19 NOTE — ASU DISCHARGE PLAN (ADULT/PEDIATRIC) - FINANCIAL ASSISTANCE
[facility] provides services at a reduced cost to those who are determined to be eligible through [facility]’s financial assistance program. Information regarding [facility]’s financial assistance program can be found by going to [email or web address] or by calling [phone].

## 2024-12-19 NOTE — ASU PATIENT PROFILE, ADULT - BLOOD AVOIDANCE/RESTRICTIONS, PROFILE
MA attempted to contact patient.No option to lvm or call back number.    Note: Patient is due for an annual physical and DM check     
none

## 2024-12-19 NOTE — ASU DISCHARGE PLAN (ADULT/PEDIATRIC) - CARE PROVIDER_API CALL
Angel Luis Dewey.  Orthopaedic Surgery  [address] [facility], [address] [place] [id number]  Phone: [phone]  Fax: [phone]  Follow Up Time:
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
## 2024-12-23 DIAGNOSIS — I10 ESSENTIAL (PRIMARY) HYPERTENSION: ICD-10-CM

## 2024-12-23 DIAGNOSIS — S83.241A OTHER TEAR OF MEDIAL MENISCUS, CURRENT INJURY, RIGHT KNEE, INITIAL ENCOUNTER: ICD-10-CM
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
## 2024-12-31 ENCOUNTER — APPOINTMENT (OUTPATIENT)
Dept: ORTHOPEDIC SURGERY | Facility: CLINIC | Age: 54
End: 2024-12-31
Payer: OTHER MISCELLANEOUS

## 2024-12-31 DIAGNOSIS — M17.11 UNILATERAL PRIMARY OSTEOARTHRITIS, RIGHT KNEE: ICD-10-CM

## 2024-12-31 DIAGNOSIS — S83.241A OTHER TEAR OF MEDIAL MENISCUS, CURRENT INJURY, RIGHT KNEE, INITIAL ENCOUNTER: ICD-10-CM

## 2024-12-31 PROCEDURE — 99024 POSTOP FOLLOW-UP VISIT: CPT

## 2025-01-06 ENCOUNTER — NON-APPOINTMENT (OUTPATIENT)
Age: 55
End: 2025-01-06

## 2025-03-18 ENCOUNTER — APPOINTMENT (OUTPATIENT)
Dept: ORTHOPEDIC SURGERY | Facility: CLINIC | Age: 55
End: 2025-03-18
Payer: OTHER MISCELLANEOUS

## 2025-03-18 VITALS — BODY MASS INDEX: 29.86 KG/M2 | HEIGHT: 68 IN | WEIGHT: 197 LBS

## 2025-03-18 DIAGNOSIS — S83.241A OTHER TEAR OF MEDIAL MENISCUS, CURRENT INJURY, RIGHT KNEE, INITIAL ENCOUNTER: ICD-10-CM

## 2025-03-18 DIAGNOSIS — M17.11 UNILATERAL PRIMARY OSTEOARTHRITIS, RIGHT KNEE: ICD-10-CM

## 2025-03-18 PROCEDURE — J3490M: CUSTOM

## 2025-03-18 PROCEDURE — 20611 DRAIN/INJ JOINT/BURSA W/US: CPT | Mod: RT

## 2025-03-18 PROCEDURE — 73562 X-RAY EXAM OF KNEE 3: CPT | Mod: RT

## 2025-03-18 PROCEDURE — 99024 POSTOP FOLLOW-UP VISIT: CPT

## 2025-07-08 ENCOUNTER — APPOINTMENT (OUTPATIENT)
Dept: ORTHOPEDIC SURGERY | Facility: CLINIC | Age: 55
End: 2025-07-08
Payer: OTHER MISCELLANEOUS

## 2025-07-08 VITALS — WEIGHT: 197 LBS | HEIGHT: 68 IN | BODY MASS INDEX: 29.86 KG/M2

## 2025-07-08 PROCEDURE — 99214 OFFICE O/P EST MOD 30 MIN: CPT | Mod: ACP

## 2025-09-09 ENCOUNTER — APPOINTMENT (OUTPATIENT)
Dept: ORTHOPEDIC SURGERY | Facility: CLINIC | Age: 55
End: 2025-09-09

## 2025-09-09 VITALS — HEIGHT: 68 IN | BODY MASS INDEX: 29.86 KG/M2 | WEIGHT: 197 LBS

## 2025-09-09 DIAGNOSIS — M17.0 BILATERAL PRIMARY OSTEOARTHRITIS OF KNEE: ICD-10-CM

## 2025-09-09 PROCEDURE — 20611 DRAIN/INJ JOINT/BURSA W/US: CPT | Mod: RT

## 2025-09-09 PROCEDURE — 99213 OFFICE O/P EST LOW 20 MIN: CPT | Mod: 25

## 2025-09-09 PROCEDURE — J3490M: CUSTOM

## (undated) DEVICE — GLV 8.5 PROTEXIS (WHITE)

## (undated) DEVICE — GLV 8.5 PROTEXIS (BLUE)

## (undated) DEVICE — SHAVER BLADE S&N INCISOR PLUS 4.5MM STRAIGHT (VIOLET)

## (undated) DEVICE — DRAPE TOWEL BLUE 17" X 24"

## (undated) DEVICE — TUBING LINVATEC ARTHROSCOPY IN/OUTFLOW

## (undated) DEVICE — NDL HYPO SAFE 18G X 1.5" (PINK)

## (undated) DEVICE — PACK KNEE ARTHROSCOPY

## (undated) DEVICE — NDL SPINAL 18G X 3.5" (PINK)